# Patient Record
Sex: FEMALE | Race: WHITE | Employment: OTHER | URBAN - METROPOLITAN AREA
[De-identification: names, ages, dates, MRNs, and addresses within clinical notes are randomized per-mention and may not be internally consistent; named-entity substitution may affect disease eponyms.]

---

## 2018-08-10 ENCOUNTER — INITIAL CONSULT (OUTPATIENT)
Dept: NEUROSURGERY | Age: 78
End: 2018-08-10
Payer: MEDICARE

## 2018-08-10 VITALS
SYSTOLIC BLOOD PRESSURE: 101 MMHG | BODY MASS INDEX: 31.15 KG/M2 | DIASTOLIC BLOOD PRESSURE: 63 MMHG | HEIGHT: 61 IN | HEART RATE: 65 BPM | WEIGHT: 165 LBS

## 2018-08-10 DIAGNOSIS — B02.23 POST-HERPETIC POLYNEUROPATHY: Primary | ICD-10-CM

## 2018-08-10 PROCEDURE — 99203 OFFICE O/P NEW LOW 30 MIN: CPT | Performed by: PHYSICIAN ASSISTANT

## 2018-08-10 RX ORDER — LANOLIN ALCOHOL/MO/W.PET/CERES
500 CREAM (GRAM) TOPICAL NIGHTLY
COMMUNITY
End: 2020-10-21

## 2018-08-10 RX ORDER — HYDROCHLOROTHIAZIDE 12.5 MG/1
12.5 CAPSULE, GELATIN COATED ORAL DAILY
COMMUNITY

## 2018-08-10 RX ORDER — LISINOPRIL 2.5 MG/1
2.5 TABLET ORAL DAILY
COMMUNITY

## 2018-08-10 RX ORDER — MAGNESIUM OXIDE 400 MG/1
400 TABLET ORAL DAILY
COMMUNITY
End: 2018-12-26 | Stop reason: ALTCHOICE

## 2018-08-10 RX ORDER — PRAVASTATIN SODIUM 40 MG
40 TABLET ORAL DAILY
COMMUNITY

## 2018-08-10 RX ORDER — OMEPRAZOLE 20 MG/1
20 CAPSULE, DELAYED RELEASE ORAL DAILY
COMMUNITY
End: 2022-07-28

## 2018-08-10 ASSESSMENT — ENCOUNTER SYMPTOMS
EYES NEGATIVE: 1
ALLERGIC/IMMUNOLOGIC NEGATIVE: 1
RESPIRATORY NEGATIVE: 1
BACK PAIN: 1
GASTROINTESTINAL NEGATIVE: 1

## 2018-08-10 NOTE — PROGRESS NOTES
Subjective:      Patient ID: Andrew Ochoa is a 69 y.o. female. Back Pain   This is a chronic problem. Episode onset: 14 months. The problem occurs daily. The problem has been gradually worsening since onset. The pain is present in the thoracic spine (and right rib pain). The quality of the pain is described as stabbing. The pain is severe. Treatments tried: some injections from pain managment. The treatment provided mild relief. Review of Systems   Constitutional: Negative. HENT: Negative. Eyes: Negative. Respiratory: Negative. Cardiovascular: Negative. Gastrointestinal: Negative. Endocrine: Negative. Genitourinary: Negative. Musculoskeletal: Positive for back pain. Skin: Negative. Allergic/Immunologic: Negative. Neurological: Negative. Hematological: Negative. Psychiatric/Behavioral: Negative. Objective:   Physical Exam   Constitutional: She appears well-developed and well-nourished. HENT:   Head: Normocephalic and atraumatic. Eyes: Conjunctivae and EOM are normal. Pupils are equal, round, and reactive to light. Neck: Normal range of motion. Neck supple. Pulmonary/Chest: No respiratory distress. Abdominal: She exhibits no distension. Musculoskeletal: Normal range of motion. She exhibits no edema or deformity. Neurological: She is alert. She has normal strength and normal reflexes. She is not disoriented. No cranial nerve deficit or sensory deficit. Coordination and gait normal. GCS eye subscore is 4. GCS verbal subscore is 5. GCS motor subscore is 6. Skin: Skin is warm and dry. Assessment:      69year old female with back and right rib pain from herpes zoster. Plan:       We will consult pain management for SCS trial.        EVANGELINA Ochoa

## 2018-08-15 ENCOUNTER — OFFICE VISIT (OUTPATIENT)
Dept: PAIN MANAGEMENT | Age: 78
End: 2018-08-15
Payer: MEDICARE

## 2018-08-15 ENCOUNTER — HOSPITAL ENCOUNTER (OUTPATIENT)
Age: 78
Discharge: HOME | End: 2018-08-17
Payer: MEDICARE

## 2018-08-15 VITALS
RESPIRATION RATE: 16 BRPM | BODY MASS INDEX: 30.58 KG/M2 | TEMPERATURE: 98.3 F | HEIGHT: 61 IN | HEART RATE: 87 BPM | DIASTOLIC BLOOD PRESSURE: 60 MMHG | SYSTOLIC BLOOD PRESSURE: 100 MMHG | WEIGHT: 162 LBS | OXYGEN SATURATION: 95 %

## 2018-08-15 DIAGNOSIS — G89.29 CHRONIC RIGHT-SIDED THORACIC BACK PAIN: ICD-10-CM

## 2018-08-15 DIAGNOSIS — G89.4 CHRONIC PAIN SYNDROME: Primary | ICD-10-CM

## 2018-08-15 DIAGNOSIS — M79.2 NEURALGIA AND NEURITIS: ICD-10-CM

## 2018-08-15 DIAGNOSIS — M54.6 CHRONIC RIGHT-SIDED THORACIC BACK PAIN: ICD-10-CM

## 2018-08-15 DIAGNOSIS — B02.29 POSTHERPETIC NEURALGIA: ICD-10-CM

## 2018-08-15 PROBLEM — E66.9 OBESITY: Status: ACTIVE | Noted: 2018-06-25

## 2018-08-15 PROBLEM — B02.9 HERPES ZOSTER: Status: ACTIVE | Noted: 2018-06-25

## 2018-08-15 PROBLEM — I10 HYPERTENSIVE DISORDER: Status: ACTIVE | Noted: 2018-06-25

## 2018-08-15 LAB — SPECIFIC GRAVITY UA: 1.01 (ref 1–1.03)

## 2018-08-15 PROCEDURE — 99204 OFFICE O/P NEW MOD 45 MIN: CPT | Performed by: PAIN MEDICINE

## 2018-08-15 PROCEDURE — 81005 URINALYSIS: CPT

## 2018-08-15 PROCEDURE — G0480 DRUG TEST DEF 1-7 CLASSES: HCPCS

## 2018-08-15 PROCEDURE — 80307 DRUG TEST PRSMV CHEM ANLYZR: CPT

## 2018-08-15 RX ORDER — OXYCODONE HYDROCHLORIDE 10 MG/1
TABLET ORAL
COMMUNITY
End: 2018-10-16 | Stop reason: SDUPTHER

## 2018-08-15 RX ORDER — NORTRIPTYLINE HYDROCHLORIDE 25 MG/1
CAPSULE ORAL
COMMUNITY
End: 2020-10-21

## 2018-08-15 NOTE — PROGRESS NOTES
8/15/2018    Josue King presents to the [address] [place] [address] on 8/15/2018. Judith Jean is complaining of pain under her right breast radiating from her middle thoracic back. The pain is constant. The pain is described as throbbing, shooting, stabbing, sharp, tender, burning, exhausting, penetrating, miserable, tiring, unbearable and feels like something is crawling. Pain is rated today at a 3 a few minutes ago and has escalated to a 5 on the VAS scale. She took her last dose of Oxyocodone last night. States that it does help but c/o constipation so she doesn't take it as often as she needs. She has been on anticoagulation medications to include ASA and is managed by PCP. /60   Pulse 87   Temp 98.3 °F (36.8 °C)   Resp 16   Ht 5' 1\" (1.549 m)   Wt 162 lb (73.5 kg)   SpO2 95%   BMI 30.61 kg/m²      NOEL Luna RN    Had been prescribed Neurontin as well as Lyrica. Both had helped but only for a short period of time.
bladder complaints. All other review of systems was negative. PHYSICAL EXAMINATION:      /60   Pulse 87   Temp 98.3 °F (36.8 °C)   Resp 16   Ht 5' 1\" (1.549 m)   Wt 162 lb (73.5 kg)   SpO2 95%   BMI 30.61 kg/m²     General:      General appearance:pleasant and well-hydrated, in no distress and A & O x3  Build:Normal Weight  Function:Rises from a seated position easily. HEENT:    Head:normocephalic, atraumatic  Pupils:regular, round, equal  Sclera: icterus absent    Lungs:    Breathing:normal breathing pattern    Abdomen:    Shape:non-distended and normal  Tenderness:none  Guarding:none    Cervical spine:    Inspection:normal  Palpation:tenderness paravertebral muscles, tenderness trapezium, left, right negative. Range of motion:abnormal mildly flexion, extension rotation bilateral and is not painful. Thoracic spine:    Spine inspection:healed hypopigmented lesions at approximately T8,9 level on the right  Palpation: hyperalgesia of paraspinals, right, approx T8,9 level. Range of motion:normal in flexion, extension rotation bilateral and is not painful. Lumbar spine:    Spine inspection:normal   CVA tenderness:No   Palpation:tenderness paravertebral muscles, left, right negative. Range of motion:abnormal mildly Lateral bending, flexion, extension rotation bilateral and is not  painful.     Musculoskeletal:    Trigger points in trapezius:absent bilaterally  Trigger points in rhomboids:absent bilaterally  Trigger points in Paraveteral:absent bilaterally  Trigger points in supraspinatus/infraspinatus:absent  SI joint tenderness:negative right, negative left               Extremities:    Tremors:None bilaterally upper and lower  Intact:Yes  Varicose veins:absent   Pulses:present Lt radial  Cyanosis:none  Edema:none x all 4 extremities    Neurological:    Sensory:normal to light touch BLE, hyperalgesia right T8,9 dermatomes    Motor:                     Right Quadriceps5/5          Left

## 2018-08-19 LAB
7-AMINOCLONAZEPAM, URINE: <5 NG/ML
ALPHA-HYDROXYALPRAZOLAM, URINE: <5 NG/ML
ALPHA-HYDROXYMIDAZOLAM, URINE: <20 NG/ML
ALPRAZOLAM, URINE: <5 NG/ML
CHLORDIAZEPOXIDE, URINE: <20 NG/ML
CLONAZEPAM, URINE: <5 NG/ML
DIAZEPAM, URINE: <20 NG/ML
LORAZEPAM, URINE: <20 NG/ML
MIDAZOLAM, URINE: <20 NG/ML
NORDIAZEPAM, URINE: <20 NG/ML
OXAZEPAM, URINE: <20 NG/ML
TEMAZEPAM, URINE: <20 NG/ML

## 2018-08-20 LAB
6AM URINE: <10 NG/ML
CODEINE, URINE: <20 NG/ML
HYDROCODONE, URINE: <20 NG/ML
HYDROMORPHONE, URINE: <20 NG/ML
MORPHINE URINE: <20 NG/ML
NORHYDROCODONE, URINE: <20 NG/ML
NOROXYCODONE, URINE: 446 NG/ML
NOROXYMORPHONE, URINE: 230 NG/ML
OXYCODONE, URINE CONFIRMATION: 171 NG/ML
OXYMORPHONE, URINE: <20 NG/ML

## 2018-08-21 LAB
Lab: NORMAL
REPORT: NORMAL
THIS TEST SENT TO: NORMAL

## 2018-08-27 ENCOUNTER — TELEPHONE (OUTPATIENT)
Dept: PAIN MANAGEMENT | Age: 78
End: 2018-08-27

## 2018-08-27 NOTE — TELEPHONE ENCOUNTER
Patient called about her appointment with Dr. Yolanda Garcia. Magaly did schedule it, and we are still awaiting insurance approval for the MRI.

## 2018-08-30 ENCOUNTER — TELEPHONE (OUTPATIENT)
Dept: PAIN MANAGEMENT | Age: 78
End: 2018-08-30

## 2018-09-05 ENCOUNTER — TELEPHONE (OUTPATIENT)
Dept: PAIN MANAGEMENT | Age: 78
End: 2018-09-05

## 2018-09-17 ENCOUNTER — OFFICE VISIT (OUTPATIENT)
Dept: PAIN MANAGEMENT | Age: 78
End: 2018-09-17
Payer: MEDICARE

## 2018-09-17 DIAGNOSIS — F45.1: ICD-10-CM

## 2018-09-17 PROCEDURE — 96101 PR PSYCHOLOGIC TESTING BY PSYCH/PHYS: CPT | Performed by: PSYCHOLOGIST

## 2018-09-17 PROCEDURE — 99406 BEHAV CHNG SMOKING 3-10 MIN: CPT | Performed by: PSYCHOLOGIST

## 2018-09-24 NOTE — PROGRESS NOTES
Patient: Sherri Bishop    Date of service: 18        1940     69 y.o. 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     I.  REFERRAL:     Ms. Lincoln Capone was referred for a psychological evaluation prior to consideration for a utilization of a spinal cord stimulator. This is being considered as a method for control of chronic pain. The present evaluation involved clinical interview, mental status evaluation, and administration of the [address] [place] [address] Street (MMPI-2).     II. BACKGROUND INFORMATION:     A social history was conducted. Mini Wallace did not note any difficulties as a young child. .Ms. Lincoln Capone had normal development. She is from an intact family, though her father passed away when Ms. Lincoln Capone was 16. Mini Wallace has 2 brothers and 4 sisters. She said that she had a normal childhood.     Ms. Tello quit  high school in the 10th grade. She quit school to assist with a younger sibling who was very ill.       Ms. Lincoln Capone is . This is her only marriage and the couple have been  for 56 years. Mini Wallace has 2 sons and 1 daughter. Things are going well at home. Ms. Lincoln Capone describes her  as [place] very supportive risa. \"     Medically, the patient says she suffers from shingles. This problem started in July. When asked to describe her pain on a 0 to 10 scale with 0 being none and 10 being excruciating, she says she ranges between 5 to 8. Mini Wallace felt that analgesic medications had helped, but only briefly. Physically, the patient says she has not significant limitations. Mini Wallace is independent in self-care. When asked if she had found anything that assisted with her pain relief, Mini Wallace stated  that applying ice helps.   Liberty Real has no history of mental health treatment. Sultana's only addiction problem was nicotine. She quit smoking in .   Ms. Lincoln Capone reports  no legal or criminal history.     Avocational interests

## 2018-10-04 ENCOUNTER — PREP FOR PROCEDURE (OUTPATIENT)
Dept: PAIN MANAGEMENT | Age: 78
End: 2018-10-04

## 2018-10-04 DIAGNOSIS — B02.29 POSTHERPETIC NEURALGIA: Primary | ICD-10-CM

## 2018-10-16 ENCOUNTER — OFFICE VISIT (OUTPATIENT)
Dept: PAIN MANAGEMENT | Age: 78
End: 2018-10-16
Payer: MEDICARE

## 2018-10-16 VITALS
DIASTOLIC BLOOD PRESSURE: 72 MMHG | HEART RATE: 84 BPM | SYSTOLIC BLOOD PRESSURE: 102 MMHG | TEMPERATURE: 98.1 F | OXYGEN SATURATION: 97 %

## 2018-10-16 DIAGNOSIS — G89.29 CHRONIC RIGHT-SIDED THORACIC BACK PAIN: ICD-10-CM

## 2018-10-16 DIAGNOSIS — B02.29 POSTHERPETIC NEURALGIA: ICD-10-CM

## 2018-10-16 DIAGNOSIS — G89.4 CHRONIC PAIN SYNDROME: Primary | ICD-10-CM

## 2018-10-16 DIAGNOSIS — M54.6 CHRONIC RIGHT-SIDED THORACIC BACK PAIN: ICD-10-CM

## 2018-10-16 PROCEDURE — 99213 OFFICE O/P EST LOW 20 MIN: CPT | Performed by: PAIN MEDICINE

## 2018-10-16 PROCEDURE — 99214 OFFICE O/P EST MOD 30 MIN: CPT | Performed by: PAIN MEDICINE

## 2018-10-16 RX ORDER — OXYCODONE HYDROCHLORIDE 10 MG/1
10 TABLET ORAL 2 TIMES DAILY PRN
Qty: 60 TABLET | Refills: 0 | Status: SHIPPED | OUTPATIENT
Start: 2018-10-16 | End: 2018-11-19 | Stop reason: SDUPTHER

## 2018-10-17 ENCOUNTER — TELEPHONE (OUTPATIENT)
Dept: PAIN MANAGEMENT | Age: 78
End: 2018-10-17

## 2018-10-17 NOTE — TELEPHONE ENCOUNTER
Dtr Eun Chan called in, she has some questions about the procedure her mother is going to have. After the 1 week trial period will she be in pain or what will happen. She asked that you call her at [phone] or Mone's number is [phone].

## 2018-10-24 ENCOUNTER — TELEPHONE (OUTPATIENT)
Dept: PAIN MANAGEMENT | Age: 78
End: 2018-10-24

## 2018-10-30 ENCOUNTER — HOSPITAL ENCOUNTER (OUTPATIENT)
Age: 78
Setting detail: OUTPATIENT SURGERY
Discharge: HOME | End: 2018-10-30
Attending: PAIN MEDICINE | Admitting: PAIN MEDICINE
Payer: MEDICARE

## 2018-10-30 ENCOUNTER — HOSPITAL ENCOUNTER (OUTPATIENT)
Dept: OPERATING ROOM | Age: 78
Setting detail: OUTPATIENT SURGERY
Discharge: HOME | End: 2018-10-30
Attending: PAIN MEDICINE
Payer: MEDICARE

## 2018-10-30 VITALS
OXYGEN SATURATION: 96 % | SYSTOLIC BLOOD PRESSURE: 137 MMHG | DIASTOLIC BLOOD PRESSURE: 79 MMHG | HEART RATE: 74 BPM | RESPIRATION RATE: 16 BRPM

## 2018-10-30 DIAGNOSIS — Z96.89 S/P INSERTION OF SPINAL CORD STIMULATOR: ICD-10-CM

## 2018-10-30 PROCEDURE — 63650 IMPLANT NEUROELECTRODES: CPT | Performed by: PAIN MEDICINE

## 2018-10-30 PROCEDURE — 3209999900 FLUORO FOR SURGICAL PROCEDURES

## 2018-10-30 PROCEDURE — 3600000015 HC SURGERY LEVEL 5 ADDTL 15MIN: Performed by: PAIN MEDICINE

## 2018-10-30 PROCEDURE — 6360000002 HC RX W HCPCS: Performed by: PAIN MEDICINE

## 2018-10-30 PROCEDURE — 3600000005 HC SURGERY LEVEL 5 BASE: Performed by: PAIN MEDICINE

## 2018-10-30 PROCEDURE — C1778 LEAD, NEUROSTIMULATOR: HCPCS | Performed by: PAIN MEDICINE

## 2018-10-30 PROCEDURE — 7100000010 HC PHASE II RECOVERY - FIRST 15 MIN: Performed by: PAIN MEDICINE

## 2018-10-30 PROCEDURE — 7100000011 HC PHASE II RECOVERY - ADDTL 15 MIN: Performed by: PAIN MEDICINE

## 2018-10-30 PROCEDURE — 2709999900 HC NON-CHARGEABLE SUPPLY: Performed by: PAIN MEDICINE

## 2018-10-30 PROCEDURE — 95972 ALYS CPLX SP/PN NPGT W/PRGRM: CPT | Performed by: PAIN MEDICINE

## 2018-10-30 PROCEDURE — 2780000010 HC IMPLANT OTHER: Performed by: PAIN MEDICINE

## 2018-10-30 PROCEDURE — 2500000003 HC RX 250 WO HCPCS: Performed by: PAIN MEDICINE

## 2018-10-30 DEVICE — ANCHOR
Type: IMPLANTABLE DEVICE | Status: FUNCTIONAL
Brand: CLIK™ X

## 2018-10-30 DEVICE — 50CM 16 CONTACT TRIAL LEAD KIT
Type: IMPLANTABLE DEVICE | Site: BACK | Status: FUNCTIONAL
Brand: INFINION™  16

## 2018-10-30 RX ORDER — CEPHALEXIN 500 MG/1
500 CAPSULE ORAL 2 TIMES DAILY
Qty: 14 CAPSULE | Refills: 0 | Status: SHIPPED | OUTPATIENT
Start: 2018-10-30 | End: 2018-11-06

## 2018-10-30 RX ORDER — LIDOCAINE HYDROCHLORIDE 5 MG/ML
INJECTION, SOLUTION INFILTRATION; INTRAVENOUS PRN
Status: DISCONTINUED | OUTPATIENT
Start: 2018-10-30 | End: 2018-10-30 | Stop reason: HOSPADM

## 2018-10-30 RX ORDER — CEFAZOLIN SODIUM 2 G/50ML
2 SOLUTION INTRAVENOUS ONCE
Status: COMPLETED | OUTPATIENT
Start: 2018-10-30 | End: 2018-10-30

## 2018-10-30 RX ORDER — SODIUM CHLORIDE, SODIUM LACTATE, POTASSIUM CHLORIDE, CALCIUM CHLORIDE 600; 310; 30; 20 MG/100ML; MG/100ML; MG/100ML; MG/100ML
INJECTION, SOLUTION INTRAVENOUS CONTINUOUS
Status: CANCELLED | OUTPATIENT
Start: 2018-10-30

## 2018-10-30 RX ORDER — CEFAZOLIN SODIUM 1 G/50ML
1 SOLUTION INTRAVENOUS ONCE
Status: DISCONTINUED | OUTPATIENT
Start: 2018-10-30 | End: 2018-10-30

## 2018-10-30 RX ADMIN — CEFAZOLIN SODIUM 2 G: 2 SOLUTION INTRAVENOUS at 08:15

## 2018-10-30 ASSESSMENT — PAIN DESCRIPTION - DESCRIPTORS
DESCRIPTORS: ACHING;SORE
DESCRIPTORS: DISCOMFORT

## 2018-10-30 ASSESSMENT — PAIN DESCRIPTION - PAIN TYPE
TYPE: SURGICAL PAIN

## 2018-10-30 ASSESSMENT — PAIN DESCRIPTION - LOCATION
LOCATION: BACK

## 2018-10-30 ASSESSMENT — PAIN SCALES - GENERAL
PAINLEVEL_OUTOF10: 8
PAINLEVEL_OUTOF10: 8
PAINLEVEL_OUTOF10: 6

## 2018-10-30 ASSESSMENT — PAIN - FUNCTIONAL ASSESSMENT: PAIN_FUNCTIONAL_ASSESSMENT: 0-10

## 2018-10-30 NOTE — H&P
[facility]  [address] [facility], [address] [place] [address] [place]  [phone]    Procedure History & Physical      Myrtice Brochure     HPI:    Patient  is here for right thoracic pain for thoracic SCS trial  Labs/imaging studies reviewed   All question and concerns addressed including R/B/A associated with the procedure    Past Medical History:   Diagnosis Date    Cervical spondylosis 7/23/2013    Confusion     Depressed     Fibromyalgia     in her neck    Heartburn     Irritable bowel     Memory loss     Migraines, neuralgic     Ringing in ears     Sleep apnea        Past Surgical History:   Procedure Laterality Date    CERVICAL FUSION  1/22/16    ANTERIOR CERVICAL FUSION C5-6, C6-7    COLONOSCOPY      CYSTOSCOPY  4/3/14    retrograde pyelogram urethral dilatation  laser lithotripsy    HEMORRHOID SURGERY      HYSTERECTOMY      LITHOTRIPSY      OVARY REMOVAL Left        Prior to Admission medications    Medication Sig Start Date End Date Taking? Authorizing Provider   amoxicillin (AMOXIL) 500 MG capsule take 1 capsule by mouth every 8 hours 6/13/18   Historical Provider, MD   fluconazole (DIFLUCAN) 150 MG tablet take 1 tablet by mouth ONCE 6/15/18   Historical Provider, MD   SUMAtriptan Succinate 6 MG/0.5ML SOAJ inject 0.5 milliliter INTO THE SKIN ONCE AS NEEDED FOR MIGRAINE 4/12/18   Historical Provider, MD   pregabalin (LYRICA) 75 MG capsule Take 75 mg by mouth 3 times daily. Bernie Luz Historical Provider, MD   ranitidine (ZANTAC) 150 MG tablet Take 150 mg by mouth 2 times daily    Historical Provider, MD   zolpidem (AMBIEN) 5 MG tablet Take 5 mg by mouth nightly as needed for Sleep. Bernie Luz Historical Provider, MD   amitriptyline (ELAVIL) 25 MG tablet Take 2 tablets by mouth nightly 4/12/18 5/31/18  Gianna Worrell MD   pregabalin (LYRICA) 75 MG capsule Take 1 capsule by mouth 3 times daily for 30 days. . 4/12/18 5/31/18  Gianna Worrell MD   diclofenac (CATAFLAM) 50 MG

## 2018-10-30 NOTE — OP NOTE
external pulse generator using a sterile connector. Several combinations of electrode configuration, frequency, amplitude and pulse width were used until comfortable, appropriate coverage of the patient's pain area was obtained. The needle was then carefully removed under live fluoroscopy and the lead was secured to the skin using a twist-lock anchor and 2-0 Nylon sutures. Fluoroscopy was used to confirm continued proper placement of the lead before being anchored . The anchor was covered with a gauze dressing under the anchor to pad the skin. The connectors were also wrapped in gauze and secured to the patient. Patient back was then cleansed, antibiotic ointment was placed over the surgical site and opsites was placed to cover the lead and the connectors. More programming was performed in the recovery area with the device representative. Disposition the patient tolerated the procedure well and there were no complications . Vital signs remained stable throughout the procedure. The patient was escorted to the recovery area where they remained until discharge and written discharge instructions for the procedure were given. Plan: Rubina Rosenberg will return to our pain management center as scheduled.      Kimberli Boyd, DO

## 2018-11-06 ENCOUNTER — OFFICE VISIT (OUTPATIENT)
Dept: PAIN MANAGEMENT | Age: 78
End: 2018-11-06
Payer: MEDICARE

## 2018-11-06 VITALS
TEMPERATURE: 98.3 F | BODY MASS INDEX: 30.58 KG/M2 | OXYGEN SATURATION: 99 % | SYSTOLIC BLOOD PRESSURE: 122 MMHG | HEIGHT: 61 IN | DIASTOLIC BLOOD PRESSURE: 74 MMHG | HEART RATE: 74 BPM | WEIGHT: 162 LBS | RESPIRATION RATE: 18 BRPM

## 2018-11-06 DIAGNOSIS — M79.2 NEURALGIA AND NEURITIS: ICD-10-CM

## 2018-11-06 DIAGNOSIS — B02.29 POSTHERPETIC NEURALGIA: Primary | ICD-10-CM

## 2018-11-06 DIAGNOSIS — G89.4 CHRONIC PAIN SYNDROME: ICD-10-CM

## 2018-11-06 PROCEDURE — 99213 OFFICE O/P EST LOW 20 MIN: CPT | Performed by: PAIN MEDICINE

## 2018-11-19 ENCOUNTER — TELEPHONE (OUTPATIENT)
Dept: PAIN MANAGEMENT | Age: 78
End: 2018-11-19

## 2018-11-19 DIAGNOSIS — B02.29 POSTHERPETIC NEURALGIA: ICD-10-CM

## 2018-11-19 DIAGNOSIS — M54.6 CHRONIC RIGHT-SIDED THORACIC BACK PAIN: ICD-10-CM

## 2018-11-19 DIAGNOSIS — G89.4 CHRONIC PAIN SYNDROME: ICD-10-CM

## 2018-11-19 DIAGNOSIS — G89.29 CHRONIC RIGHT-SIDED THORACIC BACK PAIN: ICD-10-CM

## 2018-11-19 NOTE — TELEPHONE ENCOUNTER
Safia Kraft called in and she stated that she was told to call in if she needs a refill of Oxycontin. Med pended. Next appointment is on 12-4.

## 2018-11-20 RX ORDER — OXYCODONE HYDROCHLORIDE 10 MG/1
10 TABLET ORAL 2 TIMES DAILY PRN
Qty: 28 TABLET | Refills: 0 | Status: SHIPPED | OUTPATIENT
Start: 2018-11-20 | End: 2018-12-04 | Stop reason: SDUPTHER

## 2018-11-30 NOTE — PROGRESS NOTES
[address]  [address] [facility], [address] [place] [address] [place]  [phone]    Follow up Note      Sina Juarez     Date of Visit:  12/4/2018    CC:  Patient presents for follow up   Chief Complaint   Patient presents with    Pain     back goes around to the right breast      HPI:    Pain is unchanged. Change in quality of symptoms:no. Medication side effects:none. Recent diagnostic testing:  Recent interventional procedures:SCS trial with 80% relief, did not require opioid pain medications, was more functional and was able to wear a bra    She has been on anticoagulation medications to include ASA 81 mg and has not been on herbal supplements. She is not diabetic.     Imaging:   Thoracic MRI 9/2018 -   FINDINGS: Multiplanar, multisequence MR images of the thoracic spine. Images degraded by artifact.       Thoracic vertebral height and alignment intact. No acute osseous   signal and mallet. Thoracic spinal cord exhibits unremarkable caliber   and signal. Within the exam limits, no disc herniation or central   spinal canal impingement. Lumbar xray 2018 -   1. Osteoporosis and rotoscoliosis. 2. Multilevel degenerative disc disease. 3. Disc narrowing at L3-4 is seen. Thoracic xray 2018 -   1. Osteoporosis and rotoscoliosis. 2. Multilevel disc narrowing with only minimal spurring.    3. No fracture, subluxation or destructive lesion.      Previous treatments: Acupuncture, Nerve block (intercostal), Epidural Steroid Injection x5 and medications ((gabapentin, nortriptyline, lidocaine patches and cream, prednisone, capsaicin cream, tramadol, duloxetine, cyclobenzaprine, Lyrica 50 mg and 100 mg, aspercream/lidocaine, cymbalta, fentanyl patches 12 mcg/hr and 25 mcg/hr, oxycodone 5 mg and 10 mg, carbamazepine (caused staggering and double vision), terrasil cream)) - the medications sometimes work for a short time then stop working    Potential Aberrant Drug-Related

## 2018-12-04 ENCOUNTER — OFFICE VISIT (OUTPATIENT)
Dept: PAIN MANAGEMENT | Age: 78
End: 2018-12-04
Payer: MEDICARE

## 2018-12-04 VITALS
BODY MASS INDEX: 32.59 KG/M2 | DIASTOLIC BLOOD PRESSURE: 72 MMHG | HEART RATE: 87 BPM | HEIGHT: 60 IN | OXYGEN SATURATION: 98 % | WEIGHT: 166 LBS | SYSTOLIC BLOOD PRESSURE: 124 MMHG | RESPIRATION RATE: 18 BRPM

## 2018-12-04 DIAGNOSIS — G89.29 CHRONIC RIGHT-SIDED THORACIC BACK PAIN: ICD-10-CM

## 2018-12-04 DIAGNOSIS — M79.2 NEURALGIA AND NEURITIS: Primary | ICD-10-CM

## 2018-12-04 DIAGNOSIS — B02.29 POSTHERPETIC NEURALGIA: ICD-10-CM

## 2018-12-04 DIAGNOSIS — M54.6 CHRONIC RIGHT-SIDED THORACIC BACK PAIN: ICD-10-CM

## 2018-12-04 DIAGNOSIS — G89.4 CHRONIC PAIN SYNDROME: ICD-10-CM

## 2018-12-04 PROCEDURE — 99213 OFFICE O/P EST LOW 20 MIN: CPT | Performed by: PAIN MEDICINE

## 2018-12-04 RX ORDER — OXYCODONE HYDROCHLORIDE 10 MG/1
10 TABLET ORAL 2 TIMES DAILY PRN
Qty: 60 TABLET | Refills: 0 | Status: SHIPPED | OUTPATIENT
Start: 2018-12-04 | End: 2019-01-03

## 2018-12-04 NOTE — PROGRESS NOTES
Vy Pedroza presents to the [address] on 12/4/2018. Heather Lombardo is complaining of pain in the back around the chest to the breast. The pain is constant. The pain is described as stabbing. Pain is rated today at a 8 on the VAS scale. She took her last dose of oxy ir   on  This a . She has been on anticoagulation medications to include ASA and is managed by . Marv Duenas MD    .      /72   Pulse 87   Resp 18   Ht 5' (1.524 m)   Wt 166 lb (75.3 kg)   SpO2 98%   BMI 32.42 kg/m²

## 2018-12-10 PROBLEM — F45.1: Status: ACTIVE | Noted: 2018-12-10

## 2018-12-13 ENCOUNTER — OFFICE VISIT (OUTPATIENT)
Dept: NEUROSURGERY | Age: 78
End: 2018-12-13
Payer: MEDICARE

## 2018-12-13 VITALS
WEIGHT: 170 LBS | HEART RATE: 92 BPM | BODY MASS INDEX: 33.38 KG/M2 | SYSTOLIC BLOOD PRESSURE: 128 MMHG | HEIGHT: 60 IN | DIASTOLIC BLOOD PRESSURE: 83 MMHG

## 2018-12-13 DIAGNOSIS — B02.29 POSTHERPETIC NEURALGIA AT T3-T5 LEVEL: Primary | ICD-10-CM

## 2018-12-13 PROCEDURE — 99213 OFFICE O/P EST LOW 20 MIN: CPT | Performed by: NEUROLOGICAL SURGERY

## 2018-12-13 NOTE — PROGRESS NOTES
Patient is here for follow up consult for: post-herpetic neuralgia. Physical exam  Alert and Oriented X3  PERRLA, EOMI  OLIVERA 5/5  Sensation intact to LT and PP  Reflexes are 2+ and symmetric    A/P: patient is here for follow up for: right sided post-herpetic neuralgia. She had a spinal cord stimulator trial with 80% improvement in her pain. I will schedule her for a T4 spinal cord stimulator paddle placement with [employer]

## 2018-12-17 ENCOUNTER — PREP FOR PROCEDURE (OUTPATIENT)
Dept: NEUROSURGERY | Age: 78
End: 2018-12-17

## 2018-12-17 DIAGNOSIS — B02.29 POST HERPETIC NEURALGIA: Primary | ICD-10-CM

## 2018-12-17 DIAGNOSIS — M79.604 LEG PAIN, RIGHT: ICD-10-CM

## 2018-12-17 DIAGNOSIS — R52 PAIN: ICD-10-CM

## 2018-12-17 RX ORDER — SODIUM CHLORIDE 0.9 % (FLUSH) 0.9 %
10 SYRINGE (ML) INJECTION EVERY 12 HOURS SCHEDULED
Status: CANCELLED | OUTPATIENT
Start: 2018-12-17

## 2018-12-17 RX ORDER — SODIUM CHLORIDE 0.9 % (FLUSH) 0.9 %
10 SYRINGE (ML) INJECTION PRN
Status: CANCELLED | OUTPATIENT
Start: 2018-12-17

## 2018-12-26 ENCOUNTER — HOSPITAL ENCOUNTER (OUTPATIENT)
Dept: GENERAL RADIOLOGY | Age: 78
Discharge: HOME | End: 2018-12-28
Payer: MEDICARE

## 2018-12-26 ENCOUNTER — HOSPITAL ENCOUNTER (OUTPATIENT)
Dept: PREADMISSION TESTING | Age: 78
Discharge: HOME | End: 2018-12-26
Payer: MEDICARE

## 2018-12-26 VITALS
SYSTOLIC BLOOD PRESSURE: 112 MMHG | HEIGHT: 60 IN | HEART RATE: 72 BPM | BODY MASS INDEX: 33.57 KG/M2 | RESPIRATION RATE: 20 BRPM | WEIGHT: 171 LBS | TEMPERATURE: 98 F | DIASTOLIC BLOOD PRESSURE: 67 MMHG

## 2018-12-26 DIAGNOSIS — M79.604 LEG PAIN, RIGHT: ICD-10-CM

## 2018-12-26 DIAGNOSIS — R52 PAIN: ICD-10-CM

## 2018-12-26 DIAGNOSIS — B02.29 POST HERPETIC NEURALGIA: ICD-10-CM

## 2018-12-26 LAB
ABO/RH: NORMAL
ANION GAP SERPL CALCULATED.3IONS-SCNC: 10 MMOL/L (ref 7–16)
ANTIBODY SCREEN: NORMAL
BACTERIA: ABNORMAL /HPF
BASOPHILS ABSOLUTE: 0.03 E9/L (ref 0–0.2)
BASOPHILS RELATIVE PERCENT: 0.5 % (ref 0–2)
BILIRUBIN URINE: NEGATIVE
BLOOD, URINE: NORMAL
BUN BLDV-MCNC: 18 MG/DL (ref 8–23)
CALCIUM SERPL-MCNC: 9.2 MG/DL (ref 8.6–10.2)
CHLORIDE BLD-SCNC: 105 MMOL/L (ref 98–107)
CLARITY: CLEAR
CO2: 25 MMOL/L (ref 22–29)
COLOR: YELLOW
CREAT SERPL-MCNC: 1 MG/DL (ref 0.5–1)
EOSINOPHILS ABSOLUTE: 0.06 E9/L (ref 0.05–0.5)
EOSINOPHILS RELATIVE PERCENT: 1.1 % (ref 0–6)
EPITHELIAL CELLS, UA: ABNORMAL /HPF
GFR AFRICAN AMERICAN: >60
GFR NON-AFRICAN AMERICAN: 54 ML/MIN/1.73
GLUCOSE BLD-MCNC: 81 MG/DL (ref 74–99)
GLUCOSE URINE: NEGATIVE MG/DL
HCT VFR BLD CALC: 37.5 % (ref 34–48)
HEMOGLOBIN: 12 G/DL (ref 11.5–15.5)
IMMATURE GRANULOCYTES #: 0 E9/L
IMMATURE GRANULOCYTES %: 0 % (ref 0–5)
INR BLD: 0.9
KETONES, URINE: NEGATIVE MG/DL
LEUKOCYTE ESTERASE, URINE: NEGATIVE
LYMPHOCYTES ABSOLUTE: 2.53 E9/L (ref 1.5–4)
LYMPHOCYTES RELATIVE PERCENT: 46.3 % (ref 20–42)
MCH RBC QN AUTO: 29.3 PG (ref 26–35)
MCHC RBC AUTO-ENTMCNC: 32 % (ref 32–34.5)
MCV RBC AUTO: 91.7 FL (ref 80–99.9)
MONOCYTES ABSOLUTE: 0.55 E9/L (ref 0.1–0.95)
MONOCYTES RELATIVE PERCENT: 10.1 % (ref 2–12)
NEUTROPHILS ABSOLUTE: 2.3 E9/L (ref 1.8–7.3)
NEUTROPHILS RELATIVE PERCENT: 42 % (ref 43–80)
NITRITE, URINE: NEGATIVE
PDW BLD-RTO: 12 FL (ref 11.5–15)
PH UA: 5.5 (ref 5–9)
PLATELET # BLD: 330 E9/L (ref 130–450)
PMV BLD AUTO: 8.7 FL (ref 7–12)
POTASSIUM REFLEX MAGNESIUM: 4.8 MMOL/L (ref 3.5–5)
PROTEIN UA: NEGATIVE MG/DL
PROTHROMBIN TIME: 10.6 SEC (ref 9.3–12.4)
RBC # BLD: 4.09 E12/L (ref 3.5–5.5)
RBC UA: ABNORMAL /HPF (ref 0–2)
SODIUM BLD-SCNC: 140 MMOL/L (ref 132–146)
SPECIFIC GRAVITY UA: <=1.005 (ref 1–1.03)
UROBILINOGEN, URINE: 0.2 E.U./DL
WBC # BLD: 5.5 E9/L (ref 4.5–11.5)
WBC UA: ABNORMAL /HPF (ref 0–5)

## 2018-12-26 PROCEDURE — 85610 PROTHROMBIN TIME: CPT

## 2018-12-26 PROCEDURE — 81001 URINALYSIS AUTO W/SCOPE: CPT

## 2018-12-26 PROCEDURE — 80048 BASIC METABOLIC PNL TOTAL CA: CPT

## 2018-12-26 PROCEDURE — 71046 X-RAY EXAM CHEST 2 VIEWS: CPT

## 2018-12-26 PROCEDURE — 86901 BLOOD TYPING SEROLOGIC RH(D): CPT

## 2018-12-26 PROCEDURE — 85025 COMPLETE CBC W/AUTO DIFF WBC: CPT

## 2018-12-26 PROCEDURE — 86850 RBC ANTIBODY SCREEN: CPT

## 2018-12-26 PROCEDURE — 86900 BLOOD TYPING SEROLOGIC ABO: CPT

## 2018-12-26 PROCEDURE — 87088 URINE BACTERIA CULTURE: CPT

## 2018-12-26 PROCEDURE — 36415 COLL VENOUS BLD VENIPUNCTURE: CPT

## 2018-12-26 ASSESSMENT — PAIN DESCRIPTION - ORIENTATION: ORIENTATION: RIGHT

## 2018-12-26 ASSESSMENT — PAIN DESCRIPTION - DESCRIPTORS: DESCRIPTORS: BURNING;ACHING

## 2018-12-26 ASSESSMENT — PAIN DESCRIPTION - PAIN TYPE: TYPE: ACUTE PAIN

## 2018-12-26 ASSESSMENT — PAIN DESCRIPTION - FREQUENCY: FREQUENCY: CONTINUOUS

## 2018-12-26 ASSESSMENT — PAIN DESCRIPTION - LOCATION: LOCATION: BACK;BREAST

## 2018-12-26 ASSESSMENT — PAIN SCALES - GENERAL: PAINLEVEL_OUTOF10: 4

## 2018-12-26 NOTE — PROGRESS NOTES
[facility] PRE-ADMISSION TESTING GENERAL INSTRUCTIONS- [facility]-phone number:[phone]    GENERAL INSTRUCTIONS  [x] Antibacterial Soap shower Night before and/or AM of Surgery  [] Nolan wipe instruction sheet and wipes given. [x] Nothing by mouth after midnight, including gum, candy, mints, or water. [x] You may brush your teeth, gargle, but do NOT swallow water. []Hibiclens shower  the night before and the morning of surgery. Do not use             Hibiclens on your face or head. [x]No smoking, chewing tobacco, illegal drugs, or alcohol within 24 hours of your surgery. [x] Jewelry, valuables or body piercing's should not be brought to the hospital. All body and/or tongue piercing's must be removed prior to arriving to hospital.  ALL hair pins must be removed. [x] Do not wear makeup, lotions, powders, deodorant. Nail polish as directed by the nurse. [x] Arrange transportation to and from the hospital.  Arrange for someone to be with you for the remainder of the day and for 24 hours after your procedure due to having had anesthesia. [] Bring insurance card and photo ID. [x] Transfusion Bracelet: Please bring with you to hospital, day of surgery  [] Bring urine specimen day of surgery. Any small container is acceptable. [] Use inhalers the morning of surgery and bring with you to hospital.   []Bring copy of living will or healthcare power of  papers to be placed in your electronic record. [] CPAP/BI-PAP: Please bring your machine if you are to spend the night in the hospital.     ENDOSCOPY INSTRUCTIONS:   [] Bowel prep instructions reviewed. [] Nothing by mouth after midnight, including gum, candy, mints, or water.  [] You may brush your teeth, gargle, but do NOT swallow water. [] Do not wear makeup, lotions, powders, deodorant. Nail polish as directed by the nurse.   [] Arrange transportation to and from the hospital.  Arrange for someone to be with you for the procedure, you may call the pre-op area if you have concerns about your blood sugar [phone]. [] Use your inhalers the morning of surgery. Bring your emergency inhaler with you day of surgery. [x] Follow physician instructions regarding any blood thinners you may be taking. WHAT TO EXPECT:  [x] The day of surgery you will be greeted and  checked in by the [employer] .  A nurse will greet you in accordance to the time you are needed in the pre-op area to prepare you for surgery. Please do not be discouraged if you are not greeted in the order you arrive as there are many variables that are involved in patient preparation. Your patience is greatly appreciated as you wait for your nurse. Please bring in items such as: books, magazines, newspapers, electronics, or any other items  to occupy your time in the waiting area. [x]  Delays may occur with surgery and staff will make a sincere effort to keep you informed of delays. If any delays occur with your procedure, we apologize ahead of time for your inconvenience as we recognize the value of your time.

## 2018-12-27 LAB — URINE CULTURE, ROUTINE: NORMAL

## 2018-12-28 ENCOUNTER — TELEPHONE (OUTPATIENT)
Dept: PAIN MANAGEMENT | Age: 78
End: 2018-12-28

## 2019-01-04 ENCOUNTER — PREP FOR PROCEDURE (OUTPATIENT)
Dept: NEUROSURGERY | Age: 79
End: 2019-01-04

## 2019-01-04 RX ORDER — INFLUENZA A VIRUS A/MICHIGAN/45/2015 X-275 (H1N1) ANTIGEN (FORMALDEHYDE INACTIVATED), INFLUENZA A VIRUS A/SINGAPORE/INFIMH-16-0019/2016 IVR-186 (H3N2) ANTIGEN (FORMALDEHYDE INACTIVATED), AND INFLUENZA B VIRUS B/MARYLAND/15/2016 BX-69A (A B/COLORADO/6/2017-LIKE VIRUS) ANTIGEN (FORMALDEHYDE INACTIVATED) 60; 60; 60 UG/.5ML; UG/.5ML; UG/.5ML
INJECTION, SUSPENSION INTRAMUSCULAR
Refills: 0 | COMMUNITY
Start: 2018-12-26

## 2019-01-04 RX ORDER — SODIUM CHLORIDE 0.9 % (FLUSH) 0.9 %
10 SYRINGE (ML) INJECTION PRN
Status: CANCELLED | OUTPATIENT
Start: 2019-01-04

## 2019-01-04 RX ORDER — SODIUM CHLORIDE 9 MG/ML
INJECTION, SOLUTION INTRAVENOUS CONTINUOUS
Status: CANCELLED | OUTPATIENT
Start: 2019-01-04

## 2019-01-04 RX ORDER — SODIUM CHLORIDE 0.9 % (FLUSH) 0.9 %
10 SYRINGE (ML) INJECTION EVERY 12 HOURS SCHEDULED
Status: CANCELLED | OUTPATIENT
Start: 2019-01-04

## 2019-01-06 ENCOUNTER — ANESTHESIA EVENT (OUTPATIENT)
Dept: OPERATING ROOM | Age: 79
End: 2019-01-06
Payer: MEDICARE

## 2019-01-07 ENCOUNTER — APPOINTMENT (OUTPATIENT)
Dept: GENERAL RADIOLOGY | Age: 79
End: 2019-01-07
Attending: NEUROLOGICAL SURGERY
Payer: MEDICARE

## 2019-01-07 ENCOUNTER — ANESTHESIA (OUTPATIENT)
Dept: OPERATING ROOM | Age: 79
End: 2019-01-07
Payer: MEDICARE

## 2019-01-07 ENCOUNTER — HOSPITAL ENCOUNTER (OUTPATIENT)
Age: 79
Setting detail: OUTPATIENT SURGERY
Discharge: HOME | End: 2019-01-07
Attending: NEUROLOGICAL SURGERY | Admitting: NEUROLOGICAL SURGERY
Payer: MEDICARE

## 2019-01-07 VITALS
OXYGEN SATURATION: 96 % | BODY MASS INDEX: 33.57 KG/M2 | RESPIRATION RATE: 18 BRPM | SYSTOLIC BLOOD PRESSURE: 130 MMHG | DIASTOLIC BLOOD PRESSURE: 85 MMHG | HEART RATE: 85 BPM | HEIGHT: 60 IN | WEIGHT: 171 LBS | TEMPERATURE: 97.2 F

## 2019-01-07 VITALS
RESPIRATION RATE: 14 BRPM | OXYGEN SATURATION: 100 % | DIASTOLIC BLOOD PRESSURE: 66 MMHG | SYSTOLIC BLOOD PRESSURE: 101 MMHG

## 2019-01-07 DIAGNOSIS — G89.4 CHRONIC PAIN SYNDROME: Primary | ICD-10-CM

## 2019-01-07 PROCEDURE — 3700000001 HC ADD 15 MINUTES (ANESTHESIA): Performed by: NEUROLOGICAL SURGERY

## 2019-01-07 PROCEDURE — 3209999900 FLUORO FOR SURGICAL PROCEDURES

## 2019-01-07 PROCEDURE — 3600000003 HC SURGERY LEVEL 3 BASE: Performed by: NEUROLOGICAL SURGERY

## 2019-01-07 PROCEDURE — 2500000003 HC RX 250 WO HCPCS

## 2019-01-07 PROCEDURE — C1787 PATIENT PROGR, NEUROSTIM: HCPCS | Performed by: NEUROLOGICAL SURGERY

## 2019-01-07 PROCEDURE — 6360000002 HC RX W HCPCS

## 2019-01-07 PROCEDURE — 63685 INS/RPLC SPI NPG/RCVR POCKET: CPT | Performed by: NEUROLOGICAL SURGERY

## 2019-01-07 PROCEDURE — 7100000011 HC PHASE II RECOVERY - ADDTL 15 MIN: Performed by: NEUROLOGICAL SURGERY

## 2019-01-07 PROCEDURE — 3600000013 HC SURGERY LEVEL 3 ADDTL 15MIN: Performed by: NEUROLOGICAL SURGERY

## 2019-01-07 PROCEDURE — C1820 GENERATOR NEURO RECHG BAT SY: HCPCS | Performed by: NEUROLOGICAL SURGERY

## 2019-01-07 PROCEDURE — 7100000001 HC PACU RECOVERY - ADDTL 15 MIN: Performed by: NEUROLOGICAL SURGERY

## 2019-01-07 PROCEDURE — 6360000002 HC RX W HCPCS: Performed by: NEUROLOGICAL SURGERY

## 2019-01-07 PROCEDURE — 63655 IMPLANT NEUROELECTRODES: CPT | Performed by: NEUROLOGICAL SURGERY

## 2019-01-07 PROCEDURE — 2780000010 HC IMPLANT OTHER: Performed by: NEUROLOGICAL SURGERY

## 2019-01-07 PROCEDURE — 7100000000 HC PACU RECOVERY - FIRST 15 MIN: Performed by: NEUROLOGICAL SURGERY

## 2019-01-07 PROCEDURE — 2580000003 HC RX 258: Performed by: PHYSICIAN ASSISTANT

## 2019-01-07 PROCEDURE — 6360000002 HC RX W HCPCS: Performed by: PHYSICIAN ASSISTANT

## 2019-01-07 PROCEDURE — 2580000003 HC RX 258: Performed by: NEUROLOGICAL SURGERY

## 2019-01-07 PROCEDURE — 7100000010 HC PHASE II RECOVERY - FIRST 15 MIN: Performed by: NEUROLOGICAL SURGERY

## 2019-01-07 PROCEDURE — C1713 ANCHOR/SCREW BN/BN,TIS/BN: HCPCS | Performed by: NEUROLOGICAL SURGERY

## 2019-01-07 PROCEDURE — 2709999900 HC NON-CHARGEABLE SUPPLY: Performed by: NEUROLOGICAL SURGERY

## 2019-01-07 PROCEDURE — 2500000003 HC RX 250 WO HCPCS: Performed by: NEUROLOGICAL SURGERY

## 2019-01-07 PROCEDURE — 2580000003 HC RX 258

## 2019-01-07 PROCEDURE — 2720000010 HC SURG SUPPLY STERILE: Performed by: NEUROLOGICAL SURGERY

## 2019-01-07 PROCEDURE — 3700000000 HC ANESTHESIA ATTENDED CARE: Performed by: NEUROLOGICAL SURGERY

## 2019-01-07 DEVICE — IMPLANTABLE PULSE GENERATOR KIT
Type: IMPLANTABLE DEVICE | Site: BACK | Status: FUNCTIONAL
Brand: SPECTRA WAVEWRITER™

## 2019-01-07 DEVICE — ANCHOR
Type: IMPLANTABLE DEVICE | Site: BACK | Status: FUNCTIONAL
Brand: CLIK™ X

## 2019-01-07 RX ORDER — CYCLOBENZAPRINE HCL 10 MG
10 TABLET ORAL 3 TIMES DAILY PRN
Status: DISCONTINUED | OUTPATIENT
Start: 2019-01-07 | End: 2019-01-07 | Stop reason: HOSPADM

## 2019-01-07 RX ORDER — MORPHINE SULFATE 2 MG/ML
2 INJECTION, SOLUTION INTRAMUSCULAR; INTRAVENOUS EVERY 5 MIN PRN
Status: DISCONTINUED | OUTPATIENT
Start: 2019-01-07 | End: 2019-01-07 | Stop reason: HOSPADM

## 2019-01-07 RX ORDER — LIDOCAINE HYDROCHLORIDE AND EPINEPHRINE BITARTRATE 20; .01 MG/ML; MG/ML
INJECTION, SOLUTION SUBCUTANEOUS PRN
Status: DISCONTINUED | OUTPATIENT
Start: 2019-01-07 | End: 2019-01-07 | Stop reason: HOSPADM

## 2019-01-07 RX ORDER — HYDROCODONE BITARTRATE AND ACETAMINOPHEN 5; 325 MG/1; MG/1
1 TABLET ORAL EVERY 4 HOURS PRN
Qty: 42 TABLET | Refills: 0 | Status: SHIPPED | OUTPATIENT
Start: 2019-01-07 | End: 2019-01-14

## 2019-01-07 RX ORDER — SODIUM CHLORIDE 0.9 % (FLUSH) 0.9 %
10 SYRINGE (ML) INJECTION EVERY 12 HOURS SCHEDULED
Status: DISCONTINUED | OUTPATIENT
Start: 2019-01-07 | End: 2019-01-07 | Stop reason: HOSPADM

## 2019-01-07 RX ORDER — HYDROCODONE BITARTRATE AND ACETAMINOPHEN 5; 325 MG/1; MG/1
2 TABLET ORAL EVERY 6 HOURS PRN
Qty: 56 TABLET | Refills: 0 | Status: SHIPPED | OUTPATIENT
Start: 2019-01-07 | End: 2019-01-14

## 2019-01-07 RX ORDER — PROMETHAZINE HYDROCHLORIDE 25 MG/ML
6.25 INJECTION, SOLUTION INTRAMUSCULAR; INTRAVENOUS
Status: DISCONTINUED | OUTPATIENT
Start: 2019-01-07 | End: 2019-01-07 | Stop reason: HOSPADM

## 2019-01-07 RX ORDER — SODIUM CHLORIDE 0.9 % (FLUSH) 0.9 %
10 SYRINGE (ML) INJECTION PRN
Status: DISCONTINUED | OUTPATIENT
Start: 2019-01-07 | End: 2019-01-07 | Stop reason: HOSPADM

## 2019-01-07 RX ORDER — FENTANYL CITRATE 50 UG/ML
INJECTION, SOLUTION INTRAMUSCULAR; INTRAVENOUS PRN
Status: DISCONTINUED | OUTPATIENT
Start: 2019-01-07 | End: 2019-01-07 | Stop reason: SDUPTHER

## 2019-01-07 RX ORDER — CEPHALEXIN 500 MG/1
500 CAPSULE ORAL EVERY 12 HOURS SCHEDULED
Qty: 20 CAPSULE | Refills: 0 | Status: SHIPPED | OUTPATIENT
Start: 2019-01-07 | End: 2019-01-17

## 2019-01-07 RX ORDER — NEOSTIGMINE METHYLSULFATE 1 MG/ML
INJECTION, SOLUTION INTRAVENOUS PRN
Status: DISCONTINUED | OUTPATIENT
Start: 2019-01-07 | End: 2019-01-07 | Stop reason: SDUPTHER

## 2019-01-07 RX ORDER — TIZANIDINE 4 MG/1
4 TABLET ORAL EVERY 6 HOURS PRN
Status: DISCONTINUED | OUTPATIENT
Start: 2019-01-07 | End: 2019-01-07 | Stop reason: HOSPADM

## 2019-01-07 RX ORDER — CEPHALEXIN 500 MG/1
500 CAPSULE ORAL EVERY 12 HOURS SCHEDULED
Status: DISCONTINUED | OUTPATIENT
Start: 2019-01-07 | End: 2019-01-07 | Stop reason: HOSPADM

## 2019-01-07 RX ORDER — MORPHINE SULFATE 2 MG/ML
2 INJECTION, SOLUTION INTRAMUSCULAR; INTRAVENOUS
Status: DISCONTINUED | OUTPATIENT
Start: 2019-01-07 | End: 2019-01-07 | Stop reason: DRUGHIGH

## 2019-01-07 RX ORDER — ONDANSETRON 2 MG/ML
INJECTION INTRAMUSCULAR; INTRAVENOUS PRN
Status: DISCONTINUED | OUTPATIENT
Start: 2019-01-07 | End: 2019-01-07 | Stop reason: SDUPTHER

## 2019-01-07 RX ORDER — BUPIVACAINE HYDROCHLORIDE 2.5 MG/ML
INJECTION, SOLUTION EPIDURAL; INFILTRATION; INTRACAUDAL PRN
Status: DISCONTINUED | OUTPATIENT
Start: 2019-01-07 | End: 2019-01-07 | Stop reason: HOSPADM

## 2019-01-07 RX ORDER — MEPERIDINE HYDROCHLORIDE 50 MG/ML
12.5 INJECTION INTRAMUSCULAR; INTRAVENOUS; SUBCUTANEOUS EVERY 5 MIN PRN
Status: DISCONTINUED | OUTPATIENT
Start: 2019-01-07 | End: 2019-01-07 | Stop reason: HOSPADM

## 2019-01-07 RX ORDER — PROPOFOL 10 MG/ML
INJECTION, EMULSION INTRAVENOUS PRN
Status: DISCONTINUED | OUTPATIENT
Start: 2019-01-07 | End: 2019-01-07 | Stop reason: SDUPTHER

## 2019-01-07 RX ORDER — VANCOMYCIN HYDROCHLORIDE 500 MG/10ML
INJECTION, POWDER, LYOPHILIZED, FOR SOLUTION INTRAVENOUS PRN
Status: DISCONTINUED | OUTPATIENT
Start: 2019-01-07 | End: 2019-01-07 | Stop reason: HOSPADM

## 2019-01-07 RX ORDER — LIDOCAINE HYDROCHLORIDE 20 MG/ML
INJECTION, SOLUTION EPIDURAL; INFILTRATION; INTRACAUDAL; PERINEURAL PRN
Status: DISCONTINUED | OUTPATIENT
Start: 2019-01-07 | End: 2019-01-07 | Stop reason: SDUPTHER

## 2019-01-07 RX ORDER — HYDROCODONE BITARTRATE AND ACETAMINOPHEN 5; 325 MG/1; MG/1
2 TABLET ORAL EVERY 6 HOURS PRN
Status: DISCONTINUED | OUTPATIENT
Start: 2019-01-07 | End: 2019-01-07 | Stop reason: HOSPADM

## 2019-01-07 RX ORDER — MORPHINE SULFATE 4 MG/ML
4 INJECTION, SOLUTION INTRAMUSCULAR; INTRAVENOUS
Status: DISCONTINUED | OUTPATIENT
Start: 2019-01-07 | End: 2019-01-07 | Stop reason: HOSPADM

## 2019-01-07 RX ORDER — MORPHINE SULFATE 2 MG/ML
2 INJECTION, SOLUTION INTRAMUSCULAR; INTRAVENOUS EVERY 4 HOURS PRN
Status: DISCONTINUED | OUTPATIENT
Start: 2019-01-07 | End: 2019-01-07 | Stop reason: HOSPADM

## 2019-01-07 RX ORDER — SODIUM CHLORIDE 9 MG/ML
INJECTION, SOLUTION INTRAVENOUS CONTINUOUS PRN
Status: DISCONTINUED | OUTPATIENT
Start: 2019-01-07 | End: 2019-01-07 | Stop reason: SDUPTHER

## 2019-01-07 RX ORDER — DEXAMETHASONE SODIUM PHOSPHATE 10 MG/ML
INJECTION, SOLUTION INTRAMUSCULAR; INTRAVENOUS PRN
Status: DISCONTINUED | OUTPATIENT
Start: 2019-01-07 | End: 2019-01-07 | Stop reason: SDUPTHER

## 2019-01-07 RX ORDER — TIZANIDINE 4 MG/1
4 TABLET ORAL EVERY 6 HOURS PRN
Qty: 120 TABLET | Refills: 0 | Status: SHIPPED | OUTPATIENT
Start: 2019-01-07 | End: 2020-01-07

## 2019-01-07 RX ORDER — GLYCOPYRROLATE 1 MG/5 ML
SYRINGE (ML) INTRAVENOUS PRN
Status: DISCONTINUED | OUTPATIENT
Start: 2019-01-07 | End: 2019-01-07 | Stop reason: SDUPTHER

## 2019-01-07 RX ORDER — HYDRALAZINE HYDROCHLORIDE 20 MG/ML
5 INJECTION INTRAMUSCULAR; INTRAVENOUS EVERY 10 MIN PRN
Status: DISCONTINUED | OUTPATIENT
Start: 2019-01-07 | End: 2019-01-07 | Stop reason: HOSPADM

## 2019-01-07 RX ORDER — ROCURONIUM BROMIDE 10 MG/ML
INJECTION, SOLUTION INTRAVENOUS PRN
Status: DISCONTINUED | OUTPATIENT
Start: 2019-01-07 | End: 2019-01-07 | Stop reason: SDUPTHER

## 2019-01-07 RX ORDER — SODIUM CHLORIDE 9 MG/ML
INJECTION, SOLUTION INTRAVENOUS CONTINUOUS
Status: DISCONTINUED | OUTPATIENT
Start: 2019-01-07 | End: 2019-01-07 | Stop reason: HOSPADM

## 2019-01-07 RX ORDER — HYDROCODONE BITARTRATE AND ACETAMINOPHEN 5; 325 MG/1; MG/1
1 TABLET ORAL EVERY 4 HOURS PRN
Status: DISCONTINUED | OUTPATIENT
Start: 2019-01-07 | End: 2019-01-07 | Stop reason: HOSPADM

## 2019-01-07 RX ORDER — TIZANIDINE 4 MG/1
4 TABLET ORAL EVERY 6 HOURS PRN
Qty: 30 TABLET | Refills: 0 | Status: SHIPPED | OUTPATIENT
Start: 2019-01-07 | End: 2020-10-21

## 2019-01-07 RX ORDER — LABETALOL HYDROCHLORIDE 5 MG/ML
5 INJECTION, SOLUTION INTRAVENOUS EVERY 10 MIN PRN
Status: DISCONTINUED | OUTPATIENT
Start: 2019-01-07 | End: 2019-01-07 | Stop reason: HOSPADM

## 2019-01-07 RX ADMIN — SODIUM CHLORIDE: 9 INJECTION, SOLUTION INTRAVENOUS at 12:49

## 2019-01-07 RX ADMIN — FENTANYL CITRATE 100 MCG: 50 INJECTION, SOLUTION INTRAMUSCULAR; INTRAVENOUS at 14:26

## 2019-01-07 RX ADMIN — LIDOCAINE HYDROCHLORIDE 100 MG: 20 INJECTION, SOLUTION EPIDURAL; INFILTRATION; INTRACAUDAL; PERINEURAL at 14:26

## 2019-01-07 RX ADMIN — Medication 0.6 MG: at 15:45

## 2019-01-07 RX ADMIN — SODIUM CHLORIDE: 9 INJECTION, SOLUTION INTRAVENOUS at 14:21

## 2019-01-07 RX ADMIN — Medication 3 MG: at 15:45

## 2019-01-07 RX ADMIN — ONDANSETRON HYDROCHLORIDE 4 MG: 2 INJECTION, SOLUTION INTRAMUSCULAR; INTRAVENOUS at 15:45

## 2019-01-07 RX ADMIN — PROPOFOL 130 MG: 10 INJECTION, EMULSION INTRAVENOUS at 14:26

## 2019-01-07 RX ADMIN — ROCURONIUM BROMIDE 30 MG: 10 INJECTION INTRAVENOUS at 14:26

## 2019-01-07 RX ADMIN — DEXAMETHASONE SODIUM PHOSPHATE 10 MG: 10 INJECTION INTRAMUSCULAR; INTRAVENOUS at 14:39

## 2019-01-07 RX ADMIN — Medication 2 G: at 14:36

## 2019-01-07 ASSESSMENT — PULMONARY FUNCTION TESTS
PIF_VALUE: 22
PIF_VALUE: 22
PIF_VALUE: 24
PIF_VALUE: 25
PIF_VALUE: 3
PIF_VALUE: 20
PIF_VALUE: 3
PIF_VALUE: 22
PIF_VALUE: 23
PIF_VALUE: 24
PIF_VALUE: 24
PIF_VALUE: 2
PIF_VALUE: 22
PIF_VALUE: 0
PIF_VALUE: 23
PIF_VALUE: 25
PIF_VALUE: 23
PIF_VALUE: 19
PIF_VALUE: 22
PIF_VALUE: 25
PIF_VALUE: 23
PIF_VALUE: 22
PIF_VALUE: 3
PIF_VALUE: 3
PIF_VALUE: 2
PIF_VALUE: 24
PIF_VALUE: 24
PIF_VALUE: 25
PIF_VALUE: 24
PIF_VALUE: 23
PIF_VALUE: 2
PIF_VALUE: 22
PIF_VALUE: 11
PIF_VALUE: 24
PIF_VALUE: 22
PIF_VALUE: 24
PIF_VALUE: 1
PIF_VALUE: 24
PIF_VALUE: 21
PIF_VALUE: 22
PIF_VALUE: 24
PIF_VALUE: 25
PIF_VALUE: 25
PIF_VALUE: 19
PIF_VALUE: 24
PIF_VALUE: 23
PIF_VALUE: 24
PIF_VALUE: 25
PIF_VALUE: 24
PIF_VALUE: 23
PIF_VALUE: 24
PIF_VALUE: 3
PIF_VALUE: 24
PIF_VALUE: 3
PIF_VALUE: 3
PIF_VALUE: 2
PIF_VALUE: 24
PIF_VALUE: 3
PIF_VALUE: 21
PIF_VALUE: 1
PIF_VALUE: 24
PIF_VALUE: 2
PIF_VALUE: 22
PIF_VALUE: 2
PIF_VALUE: 24
PIF_VALUE: 23
PIF_VALUE: 24
PIF_VALUE: 24
PIF_VALUE: 23
PIF_VALUE: 23
PIF_VALUE: 24
PIF_VALUE: 23
PIF_VALUE: 25
PIF_VALUE: 22
PIF_VALUE: 2
PIF_VALUE: 0
PIF_VALUE: 22
PIF_VALUE: 24
PIF_VALUE: 24
PIF_VALUE: 15
PIF_VALUE: 1
PIF_VALUE: 3
PIF_VALUE: 20
PIF_VALUE: 24
PIF_VALUE: 22
PIF_VALUE: 2
PIF_VALUE: 25

## 2019-01-07 ASSESSMENT — PAIN SCALES - GENERAL
PAINLEVEL_OUTOF10: 0

## 2019-01-07 ASSESSMENT — PAIN - FUNCTIONAL ASSESSMENT: PAIN_FUNCTIONAL_ASSESSMENT: 0-10

## 2019-01-07 ASSESSMENT — PAIN DESCRIPTION - DESCRIPTORS: DESCRIPTORS: ACHING;DISCOMFORT

## 2019-02-06 ENCOUNTER — OFFICE VISIT (OUTPATIENT)
Dept: NEUROSURGERY | Age: 79
End: 2019-02-06

## 2019-02-06 VITALS
WEIGHT: 167 LBS | DIASTOLIC BLOOD PRESSURE: 72 MMHG | HEIGHT: 61 IN | HEART RATE: 104 BPM | SYSTOLIC BLOOD PRESSURE: 119 MMHG | BODY MASS INDEX: 31.53 KG/M2

## 2019-02-06 DIAGNOSIS — R52 PAIN: Primary | ICD-10-CM

## 2019-02-06 PROCEDURE — 99024 POSTOP FOLLOW-UP VISIT: CPT | Performed by: PHYSICIAN ASSISTANT

## 2019-03-20 ENCOUNTER — TELEPHONE (OUTPATIENT)
Dept: NEUROSURGERY | Age: 79
End: 2019-03-20

## 2019-03-20 DIAGNOSIS — T85.192S MALFUNCTION OF SPINAL CORD STIMULATOR, SEQUELA: Primary | ICD-10-CM

## 2019-05-12 DIAGNOSIS — T85.192S MALFUNCTION OF SPINAL CORD STIMULATOR, SEQUELA: ICD-10-CM

## 2019-06-07 ENCOUNTER — TELEPHONE (OUTPATIENT)
Dept: NEUROSURGERY | Age: 79
End: 2019-06-07

## 2019-06-07 DIAGNOSIS — M54.40 ACUTE RIGHT-SIDED LOW BACK PAIN WITH SCIATICA, SCIATICA LATERALITY UNSPECIFIED: Primary | ICD-10-CM

## 2019-06-10 ENCOUNTER — TELEPHONE (OUTPATIENT)
Dept: NEUROSURGERY | Age: 79
End: 2019-06-10

## 2019-06-13 ENCOUNTER — TELEPHONE (OUTPATIENT)
Dept: NEUROSURGERY | Age: 79
End: 2019-06-13

## 2019-07-19 DIAGNOSIS — Z96.89 SPINAL CORD STIMULATOR STATUS: ICD-10-CM

## 2019-07-19 DIAGNOSIS — M54.6 CHRONIC RIGHT-SIDED THORACIC BACK PAIN: Primary | ICD-10-CM

## 2019-07-19 DIAGNOSIS — G89.29 CHRONIC RIGHT-SIDED THORACIC BACK PAIN: Primary | ICD-10-CM

## 2019-07-23 ENCOUNTER — OFFICE VISIT (OUTPATIENT)
Dept: PAIN MANAGEMENT | Age: 79
End: 2019-07-23
Payer: MEDICARE

## 2019-07-23 VITALS
TEMPERATURE: 97.6 F | WEIGHT: 167 LBS | HEIGHT: 61 IN | RESPIRATION RATE: 16 BRPM | BODY MASS INDEX: 31.53 KG/M2 | HEART RATE: 86 BPM | DIASTOLIC BLOOD PRESSURE: 68 MMHG | OXYGEN SATURATION: 98 % | SYSTOLIC BLOOD PRESSURE: 124 MMHG

## 2019-07-23 DIAGNOSIS — Z96.89 SPINAL CORD STIMULATOR STATUS: ICD-10-CM

## 2019-07-23 DIAGNOSIS — G89.4 CHRONIC PAIN SYNDROME: Primary | ICD-10-CM

## 2019-07-23 DIAGNOSIS — M54.6 CHRONIC RIGHT-SIDED THORACIC BACK PAIN: ICD-10-CM

## 2019-07-23 DIAGNOSIS — B02.29 POSTHERPETIC NEURALGIA: ICD-10-CM

## 2019-07-23 DIAGNOSIS — G89.29 CHRONIC RIGHT-SIDED THORACIC BACK PAIN: ICD-10-CM

## 2019-07-23 PROCEDURE — 99213 OFFICE O/P EST LOW 20 MIN: CPT | Performed by: PAIN MEDICINE

## 2019-07-23 NOTE — PROGRESS NOTES
[address]  [address] [facility], [address] [place] [address] [place]  [phone]    Follow up Note      Evgeny Locks     Date of Visit:  7/23/2019    CC:  Patient presents for follow up   Chief Complaint   Patient presents with    Follow-up     Problems with SCS      HPI:    Pain is worse. Change in quality of symptoms:yes - now having a \"jagging\" in the right thoracic region radiating into the rt breast (occurs both with and without the SCS on), began after she suffered a fall which fractured her rt shoulder requiring replacement. Medication side effects:none. Recent diagnostic testing:  Recent interventional procedures:right shoulder replacement    She has been on anticoagulation medications to include ASA 81 mg and has not been on herbal supplements. She is not diabetic.     Imaging:   Thoracic MRI 9/2018 -   FINDINGS: Multiplanar, multisequence MR images of the thoracic spine. Images degraded by artifact.       Thoracic vertebral height and alignment intact. No acute osseous   signal and mallet. Thoracic spinal cord exhibits unremarkable caliber   and signal. Within the exam limits, no disc herniation or central   spinal canal impingement. Lumbar xray 2018 -   1. Osteoporosis and rotoscoliosis. 2. Multilevel degenerative disc disease. 3. Disc narrowing at L3-4 is seen. Thoracic xray 2018 -   1. Osteoporosis and rotoscoliosis. 2. Multilevel disc narrowing with only minimal spurring.    3. No fracture, subluxation or destructive lesion.      Previous treatments: Acupuncture, Nerve block (intercostal), Epidural Steroid Injection x5 and medications ((gabapentin, nortriptyline, lidocaine patches and cream, prednisone, capsaicin cream, tramadol, duloxetine, cyclobenzaprine, Lyrica 50 mg and 100 mg, aspercream/lidocaine, cymbalta, fentanyl patches 12 mcg/hr and 25 mcg/hr, oxycodone 5 mg and 10 mg, carbamazepine (caused staggering and double vision), terrasil cream)) - the medications sometimes work for a short time then stop working    Potential Aberrant Drug-Related Behavior: no     Urine Drug Screenin2018 consistent    OARRS report:  10/2018 consistent  2018 consistent    Past Medical History:   Diagnosis Date    Hiatal hernia     per chest x ray report     Hyperlipidemia     Hypertension        Past Surgical History:   Procedure Laterality Date    BLADDER SURGERY      prolasped    BREAST SURGERY      CATARACT REMOVAL WITH IMPLANT Bilateral     CYST REMOVAL      2 removed from right breast    KNEE ARTHROSCOPY      x2    OTHER SURGICAL HISTORY  10/30/2018    thoracic spinal cord stimulator trial with [employer]    PARTIAL HYSTERECTOMY      VT PERCUT IMPLNT NEUROELECT,EPIDURAL N/A 10/30/2018    THORACIC SPINAL CORD STIMULATOR TRIAL [employer] performed by Yesenia Dugan DO at [facility] N/A 2019    T4 SPINAL CORD STIMULATOR PLACEMENT ---Randye Hash TABLE, [employer] performed by Tay Gan MD at [address] [place] [address]         Prior to Admission medications    Medication Sig Start Date End Date Taking?  Authorizing Provider   tiZANidine (ZANAFLEX) 4 MG tablet Take 1 tablet by mouth every 6 hours as needed (muscle relaxer) 19  Yes EVANGELINA Paiz   tiZANidine (ZANAFLEX) 4 MG tablet Take 1 tablet by mouth every 6 hours as needed (spasm) 19 Yes Tay Gan MD   FLUZONE HIGH-DOSE 0.5 ML MOE injection administer one injection as directed 18  Yes Historical Provider, MD   ASPERCREME LIDOCAINE EX Apply topically as needed   Yes Historical Provider, MD   Acetaminophen (TYLENOL EXTRA STRENGTH PO) Take by mouth   Yes Historical Provider, MD   nortriptyline (PAMELOR) 25 MG capsule nortriptyline 25 mg capsule   1 CAP PO HS, INSTR: INCREASE AS DIRECTED TO 3 AT BEDTIME IF NEEDED FOR SHINGLES   Yes Historical Provider, MD   hydrochlorothiazide (MICROZIDE) 12.5

## 2019-07-23 NOTE — PROGRESS NOTES
Rosalina Damian presents to the [facility] on 7/23/2019. Deepa Phillips is complaining of pain back/rt. Anterior thoracic region. The pain is constant. The pain is described as aching/stabbing. Pain is rated on her best day at a 5, on her worst day at a 10, and on average at a 6 on the VAS scale. She took her last dose of     Any procedures since your last visit:     Pacemaker or defibrilator: No managed by     She has been on anticoagulation medications to include ASA and is managed by PCP     /68   Pulse 86   Temp 97.6 °F (36.4 °C) (Oral)   Resp 16   Ht 5' 1\" (1.549 m)   Wt 167 lb (75.8 kg)   SpO2 98%   BMI 31.55 kg/m²      No LMP recorded. Patient has had a hysterectomy.

## 2019-10-10 ENCOUNTER — TELEPHONE (OUTPATIENT)
Dept: ADMINISTRATIVE | Age: 79
End: 2019-10-10

## 2020-09-16 NOTE — PROGRESS NOTES
[facility]  [address] [facility], [address] [place] [address]  [phone]    Follow up Note      Janetteluisa Noel     Date of Visit:  9/17/2020    CC:  Patient presents for follow up   Chief Complaint   Patient presents with    Pain     under right breast shoulder in the back     HPI:    Pain is worse. Change in quality of symptoms:yes - for past few weeks having intermittent pain not controlled by SCS in same distribution of PHN. Medication side effects:none. Recent diagnostic testing:  Recent interventional procedures:none since last visit. She has been on anticoagulation medications to include ASA 81 mg and has not been on herbal supplements. She is not diabetic.     Imaging:   Thoracic MRI 9/2018 -   FINDINGS: Multiplanar, multisequence MR images of the thoracic spine. Images degraded by artifact.       Thoracic vertebral height and alignment intact. No acute osseous   signal and mallet. Thoracic spinal cord exhibits unremarkable caliber   and signal. Within the exam limits, no disc herniation or central   spinal canal impingement. Lumbar xray 2018 -   1. Osteoporosis and rotoscoliosis. 2. Multilevel degenerative disc disease. 3. Disc narrowing at L3-4 is seen. Thoracic xray 2018 -   1. Osteoporosis and rotoscoliosis. 2. Multilevel disc narrowing with only minimal spurring.    3. No fracture, subluxation or destructive lesion.      Previous treatments: Acupuncture, Nerve block (intercostal), Epidural Steroid Injection x5 and medications ((gabapentin, nortriptyline, lidocaine patches and cream, prednisone, capsaicin cream, tramadol, duloxetine, cyclobenzaprine, Lyrica 50 mg and 100 mg, aspercream/lidocaine, cymbalta, fentanyl patches 12 mcg/hr and 25 mcg/hr, oxycodone 5 mg and 10 mg, carbamazepine (caused staggering and double vision), terrasil cream)) - the medications sometimes work for a short time then stop working    Potential Aberrant Drug-Related Behavior: no     Urine Drug Screenin2018 consistent    OARRS report:  10/2018 consistent  2018 consistent    Past Medical History:   Diagnosis Date    Hiatal hernia     per chest x ray report     Hyperlipidemia     Hypertension        Past Surgical History:   Procedure Laterality Date    BLADDER SURGERY      prolasped    BREAST SURGERY      CATARACT REMOVAL WITH IMPLANT Bilateral     CYST REMOVAL      2 removed from right breast    KNEE ARTHROSCOPY      x2    OTHER SURGICAL HISTORY  10/30/2018    thoracic spinal cord stimulator trial with [employer]    PARTIAL HYSTERECTOMY      [place] PERCUT IMPLNT NEUROELECT,EPIDURAL N/A 10/30/2018    THORACIC SPINAL CORD STIMULATOR TRIAL [employer] performed by Corwin Raymond DO at [facility] N/A 2019    T4 SPINAL CORD STIMULATOR PLACEMENT ---Cheikhanaterri [place] TABLE, [employer] performed by Navneet Jamil MD at [address] [place] [address]         Prior to Admission medications    Medication Sig Start Date End Date Taking?  Authorizing Provider   tiZANidine (ZANAFLEX) 4 MG tablet Take 1 tablet by mouth every 6 hours as needed (muscle relaxer) 19  Yes EVANGELINA Esqueda   FLUZONE HIGH-DOSE 0.5 ML MOE injection administer one injection as directed 18  Yes Historical Provider, MD   ASPERCREME LIDOCAINE EX Apply topically as needed   Yes Historical Provider, MD   Acetaminophen (TYLENOL EXTRA STRENGTH PO) Take by mouth   Yes Historical Provider, MD   nortriptyline (PAMELOR) 25 MG capsule nortriptyline 25 mg capsule   1 CAP PO HS, INSTR: INCREASE AS DIRECTED TO 3 AT BEDTIME IF NEEDED FOR SHINGLES   Yes Historical Provider, MD   hydrochlorothiazide (MICROZIDE) 12.5 MG capsule Take 12.5 mg by mouth daily PCP INSTRUCTED HER TO STOP   Yes Historical Provider, MD   lisinopril (PRINIVIL;ZESTRIL) 2.5 MG tablet Take 2.5 mg by mouth daily   Yes Historical Provider, MD   pravastatin (PRAVACHOL) 40 MG tablet Take 40 mg by mouth daily   Yes Historical Provider, MD   niacin 500 MG extended release capsule Take 500 mg by mouth nightly   Yes Historical Provider, MD   omeprazole (PRILOSEC) 20 MG delayed release capsule Take 20 mg by mouth daily   Yes Historical Provider, MD       No Known Allergies    Social History     Socioeconomic History    Marital status:      Spouse name: Not on file    Number of children: Not on file    Years of education: Not on file    Highest education level: Not on file   Occupational History    Not on file   Social Needs    Financial resource strain: Not on file    Food insecurity     Worry: Not on file     Inability: Not on file    Transportation needs     Medical: Not on file     Non-medical: Not on file   Tobacco Use    Smoking status: Former Smoker    Smokeless tobacco: Never Used   Substance and Sexual Activity    Alcohol use:  Yes     Alcohol/week: 1.0 standard drinks     Types: 1 Cans of beer per week     Comment: RARE    Drug use: No    Sexual activity: Not on file   Lifestyle    Physical activity     Days per week: Not on file     Minutes per session: Not on file    Stress: Not on file   Relationships    Social connections     Talks on phone: Not on file     Gets together: Not on file     Attends Oriental orthodox service: Not on file     Active member of club or organization: Not on file     Attends meetings of clubs or organizations: Not on file     Relationship status: Not on file    Intimate partner violence     Fear of current or ex partner: Not on file     Emotionally abused: Not on file     Physically abused: Not on file     Forced sexual activity: Not on file   Other Topics Concern    Not on file   Social History Narrative    Not on file       Family History   Problem Relation Age of Onset    Cancer Other     Diabetes Other     Heart Disease Other        REVIEW OF SYSTEMS:     Merrick Shankar denies fever/chills, chest pain, shortness of breath, new bowel or bladder complaints. All other review of systems was negative. PHYSICAL EXAMINATION:      /72   Pulse 66   Temp 97.2 °F (36.2 °C)   Resp 18   Ht 5' 1\" (1.549 m)   Wt 172 lb (78 kg)   SpO2 96%   BMI 32.50 kg/m²     General:       General appearance:pleasant and well-hydrated, in no distress and A & O x3  Build:Normal Weight  Function:Rises from a seated position easily.     HEENT:     Head:normocephalic, atraumatic  Pupils:regular, round, equal  Sclera: icterus absent     Lungs:     Breathing:normal breathing pattern     Abdomen:     Shape:non-distended and normal  Tenderness:none  Guarding:none      Thoracic spine:     Spine inspection:healed hypopigmented lesions at approximately T8,9 level on the right  Palpation: nontender paraspinals, right, approx T8,9 level anteriorly.   Range of motion:normal in flexion, extension rotation bilateral and is not painful.     Musculoskeletal:     Trigger points in trapezius:absent bilaterally  Trigger points in rhomboids:absent bilaterally  Trigger points in Paraveteral:absent bilaterally  Trigger points in supraspinatus/infraspinatus:absent  SI joint tenderness:negative right, negative left                Extremities:     Tremors:None bilaterally upper and lower  Intact:Yes  Varicose veins:absent   Pulses:present Lt radial  Cyanosis:none  Edema:none x all 4 extremities     Neurological:     Sensory:normal to light touch BLE, hyperalgesia right T8,9 dermatomes resolved     Motor:                     Right Quadriceps5/5          Left Quadriceps5/5           Right Gastrocnemius5/5    Left Gastrocnemius5/5  Right Ant Tibialis5/5  Left Ant Tibialis5/5     Gait:normal     Dermatology:      Skin: healed hypopigmented lesions at approximately T8,9 level on the right     Assessment/Plan:  Right T8,9 dermatomal pain due to post herpetic neuralgia treated with [facility] [employer] SCS implant (trial done by Dr. Amaya Bills, implant by Dr. Ravin Bright)    A few weeks ago developed intermittent pain in same region as PHN that is not controlled by SCS  Currently not having this pain      [employer] rep here doing reprogramming today, will have patient f/u in 4-6 weeks to see if pain better controlled  No need for thoracic xrays at this time as she has had not recent trauma  [employer] rep will continue to communicate with me in regards to patient's progress  Patient encouraged to stay active   Treatment plan discussed with the patient including potential medication side effects    Cc:  Referring physician    BALDEMAR Tejeda.

## 2020-09-17 ENCOUNTER — OFFICE VISIT (OUTPATIENT)
Dept: PAIN MANAGEMENT | Age: 80
End: 2020-09-17
Payer: MEDICARE

## 2020-09-17 VITALS
WEIGHT: 172 LBS | HEART RATE: 66 BPM | DIASTOLIC BLOOD PRESSURE: 72 MMHG | OXYGEN SATURATION: 96 % | SYSTOLIC BLOOD PRESSURE: 124 MMHG | HEIGHT: 61 IN | RESPIRATION RATE: 18 BRPM | BODY MASS INDEX: 32.47 KG/M2 | TEMPERATURE: 97.2 F

## 2020-09-17 PROCEDURE — 99213 OFFICE O/P EST LOW 20 MIN: CPT | Performed by: PAIN MEDICINE

## 2020-09-17 NOTE — PROGRESS NOTES
Do you currently have any of the following:    Fever: No  Headache:  No  Cough: No  Shortness of breath: No  Exposed to anyone with these symptoms: No         Javed Cruise presents to the [facility] on 9/17/2020. Tung Silva is complaining of pain in the back . The pain is intermittent. The pain is described as aching, throbbing, shooting and stabbing. Pain is rated on her best day at a 1, on her worst day at a 8, and on average at a 1 on the VAS scale. She took her last dose of Tylenol . Still having problems with the SCS can not get the remote to work correct   Any procedures since your last visit: No, with none % relief. Pacemaker or defibrilator: No managed by none. She is not on NSAIDS and is not on anticoagulation medication  /72   Pulse 66   Temp 97.2 °F (36.2 °C)   Resp 18   Ht 5' 1\" (1.549 m)   Wt 172 lb (78 kg)   SpO2 96%   BMI 32.50 kg/m²      No LMP recorded. Patient has had a hysterectomy.

## 2020-10-21 ENCOUNTER — OFFICE VISIT (OUTPATIENT)
Dept: PAIN MANAGEMENT | Age: 80
End: 2020-10-21
Payer: MEDICARE

## 2020-10-21 VITALS
HEIGHT: 61 IN | WEIGHT: 172 LBS | BODY MASS INDEX: 32.47 KG/M2 | TEMPERATURE: 95.7 F | HEART RATE: 86 BPM | SYSTOLIC BLOOD PRESSURE: 118 MMHG | RESPIRATION RATE: 16 BRPM | DIASTOLIC BLOOD PRESSURE: 72 MMHG | OXYGEN SATURATION: 96 %

## 2020-10-21 PROCEDURE — 99213 OFFICE O/P EST LOW 20 MIN: CPT | Performed by: PHYSICIAN ASSISTANT

## 2020-10-21 NOTE — PROGRESS NOTES
[facility] Pain Management  [facility]  [facility], [facility]    Follow up Note      Dena Roman     Date of Visit:  10/21/2020    CC:  Patient presents for follow up   Chief Complaint   Patient presents with    Follow-up     under rt. breast        HPI:    Pain is unchanged. No new issues. Patient reports pain a little better after SCS rep turned up stimulator. Appropriate analgesia with current medications regimen: Not applicable. Change in quality of symptoms:no. Medication side effects:none. Recent diagnostic testing:none. Recent interventional procedures:none. She has been on anticoagulation medications to include ASA 81 mg and has not been on herbal supplements.  She is not diabetic.     Imaging:   Thoracic MRI 9/2018 -   FINDINGS: Multiplanar, multisequence MR images of the thoracic spine. Images degraded by artifact.       Thoracic vertebral height and alignment intact. No acute osseous   signal and mallet. Thoracic spinal cord exhibits unremarkable caliber   and signal. Within the exam limits, no disc herniation or central   spinal canal impingement.      Lumbar xray 2018 -   1. Osteoporosis and rotoscoliosis. 2. Multilevel degenerative disc disease. 3. Disc narrowing at L3-4 is seen.      Thoracic xray 2018 -   1. Osteoporosis and rotoscoliosis. 2. Multilevel disc narrowing with only minimal spurring.    3. No fracture, subluxation or destructive lesion.      Previous treatments: Acupuncture, Nerve block (intercostal), Epidural Steroid Injection x5 and medications ((gabapentin, nortriptyline, lidocaine patches and cream, prednisone, capsaicin cream, tramadol, duloxetine, cyclobenzaprine, Lyrica 50 mg and 100 mg, aspercream/lidocaine, cymbalta, fentanyl patches 12 mcg/hr and 25 mcg/hr, oxycodone 5 mg and 10 mg, carbamazepine (caused staggering and double vision), terrasil cream)) - the medications sometimes work for a short time then stop working     Potential Aberrant Drug-Related Behavior: no      Urine Drug Screenin2018 consistent     OARRS report:  10/2018 consistent  2018 consistent  10/2020 consistent       Past Medical History:   Diagnosis Date    Hiatal hernia     per chest x ray report     Hyperlipidemia     Hypertension        Past Surgical History:   Procedure Laterality Date    BLADDER SURGERY      prolasped    BREAST SURGERY      CATARACT REMOVAL WITH IMPLANT Bilateral     CYST REMOVAL      2 removed from right breast    KNEE ARTHROSCOPY      x2    OTHER SURGICAL HISTORY  10/30/2018    thoracic spinal cord stimulator trial with [employer]    PARTIAL HYSTERECTOMY      [place] PERCUT IMPLNT NEUROELECT,EPIDURAL N/A 10/30/2018    THORACIC SPINAL CORD STIMULATOR TRIAL [employer] performed by Hermelinda Butts DO at [facility] N/A 2019    T4 SPINAL CORD STIMULATOR PLACEMENT ---Demetrio Elia TABLE, [employer] performed by Kathryn Vergara MD at [address] [place] [address]         Prior to Admission medications    Medication Sig Start Date End Date Taking?  Authorizing Provider   ASPERCREME LIDOCAINE EX Apply topically as needed   Yes Historical Provider, MD   Acetaminophen (TYLENOL EXTRA STRENGTH PO) Take by mouth   Yes Historical Provider, MD   hydrochlorothiazide (MICROZIDE) 12.5 MG capsule Take 12.5 mg by mouth daily PCP INSTRUCTED HER TO STOP   Yes Historical Provider, MD   lisinopril (PRINIVIL;ZESTRIL) 2.5 MG tablet Take 2.5 mg by mouth daily   Yes Historical Provider, MD   pravastatin (PRAVACHOL) 40 MG tablet Take 40 mg by mouth daily   Yes Historical Provider, MD   omeprazole (PRILOSEC) 20 MG delayed release capsule Take 20 mg by mouth daily   Yes Historical Provider, MD   FLUZONE HIGH-DOSE 0.5 ML MOE injection administer one injection as directed 18   Historical Provider, MD       No Known Allergies    Social History     Socioeconomic History    Marital status:  Spouse name: Not on file    Number of children: Not on file    Years of education: Not on file    Highest education level: Not on file   Occupational History    Not on file   Social Needs    Financial resource strain: Not on file    Food insecurity     Worry: Not on file     Inability: Not on file    Transportation needs     Medical: Not on file     Non-medical: Not on file   Tobacco Use    Smoking status: Former Smoker    Smokeless tobacco: Never Used   Substance and Sexual Activity    Alcohol use: Yes     Alcohol/week: 1.0 standard drinks     Types: 1 Cans of beer per week     Comment: RARE    Drug use: No    Sexual activity: Not on file   Lifestyle    Physical activity     Days per week: Not on file     Minutes per session: Not on file    Stress: Not on file   Relationships    Social connections     Talks on phone: Not on file     Gets together: Not on file     Attends Bahai service: Not on file     Active member of club or organization: Not on file     Attends meetings of clubs or organizations: Not on file     Relationship status: Not on file    Intimate partner violence     Fear of current or ex partner: Not on file     Emotionally abused: Not on file     Physically abused: Not on file     Forced sexual activity: Not on file   Other Topics Concern    Not on file   Social History Narrative    Not on file       Family History   Problem Relation Age of Onset    Cancer Other     Diabetes Other     Heart Disease Other        REVIEW OF SYSTEMS:     Adrianne Vaughan denies fever/chills, chest pain, shortness of breath, new bowel or bladder complaints. All other review of systems was negative. PHYSICAL EXAMINATION:      /72   Pulse 86   Temp 95.7 °F (35.4 °C) (Oral)   Resp 16   Ht 5' 1\" (1.549 m)   Wt 172 lb (78 kg)   SpO2 96%   BMI 32.50 kg/m²     General:      General appearance:   pleasant and well-hydrated.    , in no discomfort and A & O x3  Build:Overweight    HEENT:    Head:normocephalic and atraumatic  Sclera: icterus absent,    Lungs:    Breathing:Normal expansion. No rales, rhonchi, or wheezing. Abdomen:    Shape:non-distended and normal      Thoracic spine:    TTP to dermatome region affected     Extremities:    Tremors:None bilaterally upper and lower  Range of motion:Generally normal shoulders, pain with internal rotation of hips not done. Intact:Yes  Edema:Normal    Neurological:    [address]    Dermatology:    Skin:no unusual rashes, no skin lesions, no palpable subcutaneous nodules and good skin turgor    Impression:    Right T8,9 dermatomal pain due to post herpetic neuralgia treated with [facility] [employer] SCS implant (trial done by Dr. Addie Schwartz, implant by Dr. Kristina Beatty)     A few weeks ago developed intermittent pain in same region as PHN that is not controlled by SCS. Patient reports pain unchanged. Reports a little better after the rep adjusted her stimulator today. Patient leaving for [place] on 10/25 and returning in May. She has a number to contact the rep in [place] with any issues. [employer] rep here doing reprogramming today  No need for thoracic xrays at this time as she has had not recent trauma  [employer] rep will continue to communicate with me in regards to patient's progress  Patient encouraged to stay active   Treatment plan discussed with the patient including potential medication side effects      Controlled Substance Monitoring:    Acute and Chronic Pain Monitoring:   RX Monitoring 10/21/2020   Periodic Controlled Substance Monitoring No signs of potential drug abuse or diversion identified. ;Assessed functional status. We discussed with the patient that combining opioids, benzodiazepines, alcohol, illicit drugs or sleep aids increases the risk of respiratory depression including death.  We discussed that these medications may cause drowsiness, sedation or dizziness and have counseled the patient not to drive or operate machinery. We have discussed that these medications will be prescribed only by one provider. We have discussed with the patient about age related risk factors and have thoroughly discussed the importance of taking these medications as prescribed. The patient verbalizes understanding.     ccreferring physic

## 2020-10-21 NOTE — PROGRESS NOTES
Do you currently have any of the following:    Fever: No  Headache:  No  Cough: No  Shortness of breath: No  Exposed to anyone with these symptoms: No         Trey Leung presents to the [facility] on 10/21/2020. Jessie Miles is complaining of pain under rt. breast  The pain is constant. The pain is described as aching. Pain is rated on her best day at a 2, on her worst day at a 10, and on average at a 4 on the VAS scale. She took her last dose of     Any procedures since your last visit:     Pacemaker or defibrilator: No managed by . She is not on NSAIDS and is not on anticoagulation medications to include none and is managed by    /72   Pulse 86   Temp 95.7 °F (35.4 °C) (Oral)   Resp 16   Ht 5' 1\" (1.549 m)   Wt 172 lb (78 kg)   SpO2 96%   BMI 32.50 kg/m²      No LMP recorded. Patient has had a hysterectomy.

## 2020-12-17 NOTE — PROGRESS NOTES
[facility]  [address]  [facility]    Follow up Note      Pedro Marin     Date of Visit:  12/18/2020    CC:  Patient presents for follow up   Chief Complaint   Patient presents with    Other     pain from shingles  no pain currently     Consent:  Telehealth Visit due to [place] 19 pandemic  The patient and/or health care decision maker is aware that he/she may receive a bill for this tele-health service Doxy Me, depending on his insurance coverage, and has provided verbal consent to proceed: Yes  I have considered the risks of abuse, dependence, addiction and diversion. My patient is aware that they will need a follow-up visit (in-person or virtually) at the appropriate time indicated for continued medications. Further, my patient is aware that when this acute crisis has lifted, they will be expected to return for an in-person visit and all elements of standard local and hospital guidelines in order to continue this medication. Patient Location:Home   Physician Location: Other address in [place]      HPI:    Pain is unchanged. No new issues. Patient reports pain a little better after SCS rep turned up stimulator. Appropriate analgesia with current medications regimen: Not applicable. Change in quality of symptoms:no. Medication side effects:none. Recent diagnostic testing:none. Recent interventional procedures:none. She has been on anticoagulation medications to include ASA 81 mg and has not been on herbal supplements.  She is not diabetic.     Imaging:   Thoracic MRI 9/2018 -   FINDINGS: Multiplanar, multisequence MR images of the thoracic spine. Images degraded by artifact.       Thoracic vertebral height and alignment intact. No acute osseous   signal and mallet. Thoracic spinal cord exhibits unremarkable caliber   and signal. Within the exam limits, no disc herniation or central   spinal canal impingement.      Lumbar xray 2018 -   1.  Osteoporosis and rotoscoliosis. 2. Multilevel degenerative disc disease. 3. Disc narrowing at L3-4 is seen.      Thoracic xray 2018 -   1. Osteoporosis and rotoscoliosis. 2. Multilevel disc narrowing with only minimal spurring. 3. No fracture, subluxation or destructive lesion.      Previous treatments: Acupuncture, Nerve block (intercostal), Epidural Steroid Injection x5 and medications ((gabapentin, nortriptyline, lidocaine patches and cream, prednisone, capsaicin cream, tramadol, duloxetine, cyclobenzaprine, Lyrica 50 mg and 100 mg, aspercream/lidocaine, cymbalta, fentanyl patches 12 mcg/hr and 25 mcg/hr, oxycodone 5 mg and 10 mg, carbamazepine (caused staggering and double vision), terrasil cream)) - the medications sometimes work for a short time then stop working     Potential Aberrant Drug-Related Behavior: no      Urine Drug Screenin2018 consistent     OARRS report:  10/2018 consistent  2018 consistent  10/2020 consistent       Past Medical History:   Diagnosis Date    Hiatal hernia     per chest x ray report     Hyperlipidemia     Hypertension        Past Surgical History:   Procedure Laterality Date    BLADDER SURGERY      prolasped    BREAST SURGERY      CATARACT REMOVAL WITH IMPLANT Bilateral     CYST REMOVAL      2 removed from right breast    KNEE ARTHROSCOPY      x2    OTHER SURGICAL HISTORY  10/30/2018    thoracic spinal cord stimulator trial with [employer]    PARTIAL HYSTERECTOMY      LA PERCUT IMPLNT NEUROELECT,EPIDURAL N/A 10/30/2018    THORACIC SPINAL CORD STIMULATOR TRIAL [employer] performed by Fernando Rivera DO at [address] N/A 2019    T4 SPINAL CORD STIMULATOR PLACEMENT ---Connye Eliezer TABLE, [employer] performed by Mars Jenkins MD at [address] [place] [address]         Prior to Admission medications    Medication Sig Start Date End Date Taking?  Authorizing Provider   FLUZONE HIGH-DOSE 0.5 ML MOE injection administer one injection as directed 12/26/18  Yes Historical Provider, MD Novak Roll topically as needed   Yes Historical Provider, MD   Acetaminophen (TYLENOL EXTRA STRENGTH PO) Take by mouth   Yes Historical Provider, MD   hydrochlorothiazide (MICROZIDE) 12.5 MG capsule Take 12.5 mg by mouth daily PCP INSTRUCTED HER TO STOP   Yes Historical Provider, MD   lisinopril (PRINIVIL;ZESTRIL) 2.5 MG tablet Take 2.5 mg by mouth daily   Yes Historical Provider, MD   pravastatin (PRAVACHOL) 40 MG tablet Take 40 mg by mouth daily   Yes Historical Provider, MD   omeprazole (PRILOSEC) 20 MG delayed release capsule Take 20 mg by mouth daily   Yes Historical Provider, MD       No Known Allergies    Social History     Socioeconomic History    Marital status:      Spouse name: Not on file    Number of children: Not on file    Years of education: Not on file    Highest education level: Not on file   Occupational History    Not on file   Social Needs    Financial resource strain: Not on file    Food insecurity     Worry: Not on file     Inability: Not on file    Transportation needs     Medical: Not on file     Non-medical: Not on file   Tobacco Use    Smoking status: Former Smoker    Smokeless tobacco: Never Used   Substance and Sexual Activity    Alcohol use:  Yes     Alcohol/week: 1.0 standard drinks     Types: 1 Cans of beer per week     Comment: RARE    Drug use: No    Sexual activity: Not on file   Lifestyle    Physical activity     Days per week: Not on file     Minutes per session: Not on file    Stress: Not on file   Relationships    Social connections     Talks on phone: Not on file     Gets together: Not on file     Attends Protestant service: Not on file     Active member of club or organization: Not on file     Attends meetings of clubs or organizations: Not on file     Relationship status: Not on file    Intimate partner violence     Fear of current or ex partner: about age related risk factors and have thoroughly discussed the importance of taking these medications as prescribed. The patient verbalizes understanding. Patient advised regarding steps to help prevent the spread of COVID-19   SOURCE - https://jarad-luis fernando.info/. html     1-Stay home except to get medical care  2-Clean your hands often for atleast 20 seconds, avoid touching: Avoid touching your eyes, nose, and mouth with unwashed hands. 3-Seek medical attention: Seek prompt medical attention if your illness is worsening (e.g., difficulty breathing). Call you doctor first.  3-Wear a facemask if you are sick   4-Cover your coughs and sneezes           I affirm this is a Patient Initiated Episode with an established Patient who has not had a related appointment within my department in the past 7 days or scheduled within the next 24 hours.     Total Time: 16 minutes      Cc: Referring physician

## 2020-12-18 ENCOUNTER — VIRTUAL VISIT (OUTPATIENT)
Dept: PAIN MANAGEMENT | Age: 80
End: 2020-12-18
Payer: MEDICARE

## 2020-12-18 PROCEDURE — 99213 OFFICE O/P EST LOW 20 MIN: CPT | Performed by: PAIN MEDICINE

## 2020-12-18 NOTE — PROGRESS NOTES
Nolvia Liang was read the following message We want to confirm that, for purposes of billing, this is a virtual visit with your provider for which we will submit a claim for reimbursement with your insurance company. You will be responsible for any copays, coinsurance amounts or other amounts not covered by your insurance company. If you do not accept this, unfortunately we will not be able to schedule a virtual visit with the provider. Do you accept?  Sean Marroquin responded Vena Pickup

## 2022-07-28 ENCOUNTER — OFFICE VISIT (OUTPATIENT)
Dept: NEUROSURGERY | Age: 82
End: 2022-07-28
Payer: MEDICARE

## 2022-07-28 VITALS
DIASTOLIC BLOOD PRESSURE: 62 MMHG | RESPIRATION RATE: 20 BRPM | WEIGHT: 172 LBS | SYSTOLIC BLOOD PRESSURE: 114 MMHG | BODY MASS INDEX: 32.47 KG/M2 | OXYGEN SATURATION: 96 % | HEART RATE: 75 BPM | TEMPERATURE: 97.9 F | HEIGHT: 61 IN

## 2022-07-28 DIAGNOSIS — G89.29 CHRONIC BILATERAL LOW BACK PAIN WITHOUT SCIATICA: Primary | ICD-10-CM

## 2022-07-28 DIAGNOSIS — M54.50 CHRONIC BILATERAL LOW BACK PAIN WITHOUT SCIATICA: Primary | ICD-10-CM

## 2022-07-28 PROCEDURE — 1123F ACP DISCUSS/DSCN MKR DOCD: CPT | Performed by: NEUROLOGICAL SURGERY

## 2022-07-28 PROCEDURE — 99212 OFFICE O/P EST SF 10 MIN: CPT

## 2022-07-28 PROCEDURE — 99212 OFFICE O/P EST SF 10 MIN: CPT | Performed by: NEUROLOGICAL SURGERY

## 2022-07-28 RX ORDER — OMEPRAZOLE 40 MG/1
CAPSULE, DELAYED RELEASE ORAL
COMMUNITY
Start: 2022-06-03

## 2022-07-28 NOTE — PROGRESS NOTES
Patient is here for follow up consult for: SCS programming. Physical exam  Alert and Oriented X3  PERRLA, EOMI  OLIVERA 5/5  Sensation intact to LT and PP  Reflexes are 2+ and symmetric    A/P: patient is here for follow up for: back pain. Shobha from [address] helped with programming her stimulator. Her x-rays look good.   Follow up tasha Colorado MD

## 2022-08-08 ENCOUNTER — TELEPHONE (OUTPATIENT)
Dept: NEUROSURGERY | Age: 82
End: 2022-08-08

## 2022-08-08 NOTE — TELEPHONE ENCOUNTER
Pt was seen by Dr Marquez Rockwell on 7/28/22 along with [facility]. Pt states Dr Marquez Rockwell informed her that if the stimulator was still not working that he would refer her to another surgeon. Pt did not know who that  was and there is no mention in Dr Randal Bah note. Will speak with Dr Marquez Rockwell regarding this tomorrow when he is in the office. Pt acknowledged. Pt can be reached at [phone].

## 2022-08-09 DIAGNOSIS — B02.29 POSTHERPETIC NEURALGIA: Primary | ICD-10-CM

## 2022-10-04 NOTE — TELEPHONE ENCOUNTER
Dr. Caridad Mendieta at [facility] clinic for intercostal nerve section    Referral faxed on 8/12/2022

## 2022-10-11 ENCOUNTER — TELEPHONE (OUTPATIENT)
Dept: NEUROSURGERY | Age: 82
End: 2022-10-11

## 2022-10-11 NOTE — TELEPHONE ENCOUNTER
Spoke with Pts daughter, informed her Dr Alena Contreras office is expediting her referral and will call the patient to schedule. Daughter given [facility] # [phone].

## 2022-10-27 DIAGNOSIS — G58.8 INTERCOSTAL NEURALGIA: Primary | ICD-10-CM

## 2023-07-21 ENCOUNTER — OFFICE VISIT (OUTPATIENT)
Dept: PAIN MANAGEMENT | Age: 83
End: 2023-07-21
Payer: MEDICARE

## 2023-07-21 VITALS
HEIGHT: 61 IN | HEART RATE: 71 BPM | BODY MASS INDEX: 31.15 KG/M2 | RESPIRATION RATE: 18 BRPM | TEMPERATURE: 97.3 F | DIASTOLIC BLOOD PRESSURE: 63 MMHG | OXYGEN SATURATION: 98 % | SYSTOLIC BLOOD PRESSURE: 96 MMHG | WEIGHT: 165 LBS

## 2023-07-21 DIAGNOSIS — G89.4 CHRONIC PAIN SYNDROME: Primary | ICD-10-CM

## 2023-07-21 DIAGNOSIS — M79.2 NEUROPATHIC PAIN: ICD-10-CM

## 2023-07-21 DIAGNOSIS — B02.29 POSTHERPETIC NEURALGIA: ICD-10-CM

## 2023-07-21 PROCEDURE — 1123F ACP DISCUSS/DSCN MKR DOCD: CPT | Performed by: PAIN MEDICINE

## 2023-07-21 PROCEDURE — G8400 PT W/DXA NO RESULTS DOC: HCPCS | Performed by: PAIN MEDICINE

## 2023-07-21 PROCEDURE — 3074F SYST BP LT 130 MM HG: CPT | Performed by: PAIN MEDICINE

## 2023-07-21 PROCEDURE — 1036F TOBACCO NON-USER: CPT | Performed by: PAIN MEDICINE

## 2023-07-21 PROCEDURE — 99214 OFFICE O/P EST MOD 30 MIN: CPT | Performed by: PAIN MEDICINE

## 2023-07-21 PROCEDURE — 99215 OFFICE O/P EST HI 40 MIN: CPT | Performed by: PAIN MEDICINE

## 2023-07-21 PROCEDURE — G8417 CALC BMI ABV UP PARAM F/U: HCPCS | Performed by: PAIN MEDICINE

## 2023-07-21 PROCEDURE — 3078F DIAST BP <80 MM HG: CPT | Performed by: PAIN MEDICINE

## 2023-07-21 PROCEDURE — 1090F PRES/ABSN URINE INCON ASSESS: CPT | Performed by: PAIN MEDICINE

## 2023-07-21 PROCEDURE — G8427 DOCREV CUR MEDS BY ELIG CLIN: HCPCS | Performed by: PAIN MEDICINE

## 2023-07-21 RX ORDER — OXYCODONE HYDROCHLORIDE AND ACETAMINOPHEN 5; 325 MG/1; MG/1
1 TABLET ORAL DAILY PRN
Qty: 30 TABLET | Refills: 0 | Status: SHIPPED | OUTPATIENT
Start: 2023-07-21 | End: 2023-08-20

## 2023-07-21 NOTE — PROGRESS NOTES
Valarie Gutierrez presents to the [facility] on 7/21/2023. Harsh Faulkner is complaining of pain under left arm. The pain is constant. The pain is described as throbbing, stabbing, nagging, and miserable. Pain is rated on her best day at a 2, on her worst day at a 10, and on average at a 8 on the VAS scale. She took her last dose of Tylenol a few days ago. Any procedures since your last visit: No    Pacemaker or defibrillator: No  She is not on NSAIDS and is not on anticoagulation medications . Medication Contract and Consent for Opioid Use Documents Filed       Patient Documents       Type of Document Status Date Received Received By Description    Medication Contract Received 8/15/2018  1:26 PM Talon BARAHONA [facility]IN [facility]NAGEMENT PATIENT AGREEMENT 8/15/2018    Medication Contract Received 1/26/2019  9:43 AM [facility] medication contract    Medication Contract Re[facility] 10/21/2020  2:48 [facility]                    There were no vitals taken for this visit. No LMP recorded. Patient has had a hysterectomy.

## 2023-07-21 NOTE — PROGRESS NOTES
[facility] - [facility] Pain Management  [address] [address] [facility] - [facility], [address] [facility]    Follow up Note      La Nena Strong     Date of Visit:  2023    CC:  Patient presents for follow up   Chief Complaint   Patient presents with    Pain     Shingles pain     HPI:    Pain is unchanged. Change in quality of symptoms:no. Medication side effects:none. Recent diagnostic testing:none. Recent interventional procedures:none. She has been on anticoagulation medications to include ASA 81 mg and has not been on herbal supplements. She is not diabetic. Imaging:   Thoracic MRI 2018 -   FINDINGS: Multiplanar, multisequence MR images of the thoracic spine. Images degraded by artifact. Thoracic vertebral height and alignment intact. No acute osseous   signal and mallet. Thoracic spinal cord exhibits unremarkable caliber   and signal. Within the exam limits, no disc herniation or central   spinal canal impingement. Lumbar xray 2018 -   1. Osteoporosis and rotoscoliosis. 2. Multilevel degenerative disc disease. 3. Disc narrowing at L3-4 is seen. Thoracic xray 2018 -   1. Osteoporosis and rotoscoliosis. 2. Multilevel disc narrowing with only minimal spurring. 3. No fracture, subluxation or destructive lesion.       Previous treatments: Acupuncture, Nerve block (intercostal), Epidural Steroid Injection x5 and medications ((gabapentin, nortriptyline, lidocaine patches and cream, prednisone, capsaicin cream, tramadol, duloxetine, cyclobenzaprine, Lyrica 50 mg and 100 mg, aspercream/lidocaine, cymbalta, fentanyl patches 12 mcg/hr and 25 mcg/hr, oxycodone 5 mg and 10 mg, carbamazepine (caused staggering and double vision), terrasil cream)) - the medications sometimes work for a short time then stop working     Potential Aberrant Drug-Related Behavior: no      Urine Drug Screenin2018 consistent     OARRS report:  10/2018 consistent  2018 consistent  10/2020 consistent       Past Medical History:

## 2023-07-24 LAB
6-MAM, QUANTITATIVE, URINE: <10 NG/ML
6-MONOACETYLMORPHINE, URINE: NEGATIVE
7-AMINOCLONAZEPAM, QUANTITATIVE, URINE: <50 NG/ML
ABNORMAL SPECIMEN VALIDITY TEST: NORMAL
ALCOHOL URINE: NOT DETECTED MG/DL
ALPHA-HYDROXYALPRAZOLAM, QUANTITATIVE, URINE: <50 NG/ML
ALPHA-HYDROXYMIDAZOLAM, QUANTITATIVE, URINE: <50 NG/ML
ALPHA-HYDROXYTRIAZOLAM, QUANTITATIVE, URINE: <50 NG/ML
ALPRAZOLAM URINE QUANT: <50 NG/ML
AMPHETAMINE SCREEN URINE: NEGATIVE
BARBITURATE SCREEN URINE: NEGATIVE
BENZODIAZEPINE SCREEN, URINE: NEGATIVE
BUPRENORPHINE URINE: NEGATIVE
CANNABINOID SCREEN URINE: NEGATIVE
CHLORDIAZEPOXIDE, QUANTITATIVE, URINE: <50 NG/ML
CLONAZEPAM, QUANTITATIVE, URINE: <50 NG/ML
COCAINE METABOLITE, URINE: NEGATIVE
CODEINE, QUANTITATIVE, URINE: <50 NG/ML
COMPLIANCE DRUG ANALYSIS, URINE: NORMAL
DIAZEPAM URINE QUANT: <50 NG/ML
FENTANYL URINE: NEGATIVE
FLUNITRAZEPAM, QUANTITATIVE, URINE: <50 NG/ML
FLURAZEPAM, QUANTITATIVE, URINE: <50 NG/ML
HYDROCODONE, QUANTITATIVE, URINE: <50 NG/ML
HYDROMORPHONE, QUANTITATIVE, URINE: <50 NG/ML
INTEGRITY CHECK, CREATININE, URINE: 43.3
INTEGRITY CHECK, OXIDANT, URINE: 22
INTEGRITY CHECK, PH, URINE: 6.2
INTEGRITY CHECK, SPECIFIC GRAVITY, URINE: 1.01
LORAZEPAM URINE QUANT: <50 NG/ML
METHADONE SCREEN, URINE: NEGATIVE
MIDAZOLAM URINE QUANT: <50 NG/ML
MORPHINE, QUANTITATIVE, URINE: <50 NG/ML
NORDIAZEPAM URINE QUANT: <50 NG/ML
NORHYDROCODONE, QUANTITATIVE, URINE: <50 NG/ML
NOROXYCODONE, QUANTITATIVE, URINE: <50 NG/ML
OPIATES, URINE: NEGATIVE
OXAZEPAM URINE QUANT: <50 NG/ML
OXYCODONE SCREEN URINE: NEGATIVE
OXYCODONE URINE, QUANTITATIVE: <50 NG/ML
OXYMORPHONE, QUANTITATIVE, URINE: <50 NG/ML
PHENCYCLIDINE, URINE: NEGATIVE
TEMAZEPAM, QUANTITATIVE, URINE: <50 NG/ML
TEST INFORMATION: NORMAL
TRAMADOL, URINE: NEGATIVE

## 2023-08-23 ENCOUNTER — PREP FOR PROCEDURE (OUTPATIENT)
Dept: PAIN MANAGEMENT | Age: 83
End: 2023-08-23

## 2023-08-23 ENCOUNTER — OFFICE VISIT (OUTPATIENT)
Dept: PAIN MANAGEMENT | Age: 83
End: 2023-08-23
Payer: MEDICARE

## 2023-08-23 VITALS
SYSTOLIC BLOOD PRESSURE: 108 MMHG | OXYGEN SATURATION: 98 % | RESPIRATION RATE: 16 BRPM | HEART RATE: 70 BPM | DIASTOLIC BLOOD PRESSURE: 70 MMHG | TEMPERATURE: 97.2 F

## 2023-08-23 DIAGNOSIS — M79.2 NEUROPATHIC PAIN: ICD-10-CM

## 2023-08-23 DIAGNOSIS — B02.29 POSTHERPETIC NEURALGIA: ICD-10-CM

## 2023-08-23 DIAGNOSIS — G58.8 INTERCOSTAL NEURALGIA: ICD-10-CM

## 2023-08-23 DIAGNOSIS — G58.8 INTERCOSTAL NEURALGIA: Primary | ICD-10-CM

## 2023-08-23 DIAGNOSIS — G89.4 CHRONIC PAIN SYNDROME: Primary | ICD-10-CM

## 2023-08-23 PROCEDURE — G8427 DOCREV CUR MEDS BY ELIG CLIN: HCPCS | Performed by: PAIN MEDICINE

## 2023-08-23 PROCEDURE — 99213 OFFICE O/P EST LOW 20 MIN: CPT

## 2023-08-23 PROCEDURE — G8417 CALC BMI ABV UP PARAM F/U: HCPCS | Performed by: PAIN MEDICINE

## 2023-08-23 PROCEDURE — 3074F SYST BP LT 130 MM HG: CPT | Performed by: PAIN MEDICINE

## 2023-08-23 PROCEDURE — 1090F PRES/ABSN URINE INCON ASSESS: CPT | Performed by: PAIN MEDICINE

## 2023-08-23 PROCEDURE — G8400 PT W/DXA NO RESULTS DOC: HCPCS | Performed by: PAIN MEDICINE

## 2023-08-23 PROCEDURE — 99215 OFFICE O/P EST HI 40 MIN: CPT | Performed by: PAIN MEDICINE

## 2023-08-23 PROCEDURE — 1123F ACP DISCUSS/DSCN MKR DOCD: CPT | Performed by: PAIN MEDICINE

## 2023-08-23 PROCEDURE — 1036F TOBACCO NON-USER: CPT | Performed by: PAIN MEDICINE

## 2023-08-23 PROCEDURE — 3078F DIAST BP <80 MM HG: CPT | Performed by: PAIN MEDICINE

## 2023-08-23 RX ORDER — OXYCODONE HYDROCHLORIDE AND ACETAMINOPHEN 5; 325 MG/1; MG/1
1 TABLET ORAL DAILY PRN
Qty: 30 TABLET | Refills: 0 | Status: SHIPPED | OUTPATIENT
Start: 2023-08-23 | End: 2023-09-22

## 2023-08-23 RX ORDER — METOCLOPRAMIDE 5 MG/1
TABLET ORAL
COMMUNITY
Start: 2023-07-18

## 2023-08-28 ENCOUNTER — TELEPHONE (OUTPATIENT)
Dept: PAIN MANAGEMENT | Age: 83
End: 2023-08-28

## 2023-08-28 NOTE — TELEPHONE ENCOUNTER
Call to Refugio Randall that procedure was approved for 9/5/2023 and that Mars Restrepo should call her a few days before for the pre op call and between 2:00 PM and 4:00 PM  the business day before with the arrival time. Instructed Sultana to hold ibuprofen for 24 hours, naprosyn for 4 days and any aspirin containing products or fish oil for 7 days. Instructed to call office back if any questions. Jorgito Matute verbalized understanding.     Electronically signed by Nabil Edmonds RN on 8/28/2023 at 2:47 PM

## 2023-08-30 NOTE — PROGRESS NOTES
[address] PAIN MANAGEMENT  INSTRUCTIONS  . .......................................................................................................................................... [x] Parking the day of Surgery is located in the [facility].   Upon entering the door, make immediate right into the surgery reception room    [x]  Bring photo ID and insurance card     [x] You may have a light breakfast day of procedure    [x]  Wear loose comfortable clothing    [x]  Please follow instructions for medications as given per Dr's office    [x] You can expect a call the business day prior to procedure to notify you of your arrival time     [x] Please arrange for     []  Other instructions

## 2023-09-05 ENCOUNTER — HOSPITAL ENCOUNTER (OUTPATIENT)
Age: 83
Setting detail: OUTPATIENT SURGERY
Discharge: HOME | End: 2023-09-05
Attending: PAIN MEDICINE | Admitting: PAIN MEDICINE
Payer: MEDICARE

## 2023-09-05 ENCOUNTER — HOSPITAL ENCOUNTER (OUTPATIENT)
Dept: GENERAL RADIOLOGY | Age: 83
Setting detail: OUTPATIENT SURGERY
Discharge: HOME | End: 2023-09-07
Attending: PAIN MEDICINE
Payer: MEDICARE

## 2023-09-05 VITALS
BODY MASS INDEX: 30.96 KG/M2 | SYSTOLIC BLOOD PRESSURE: 130 MMHG | DIASTOLIC BLOOD PRESSURE: 78 MMHG | RESPIRATION RATE: 18 BRPM | WEIGHT: 164 LBS | HEIGHT: 61 IN | OXYGEN SATURATION: 99 % | HEART RATE: 64 BPM

## 2023-09-05 DIAGNOSIS — R52 PAIN MANAGEMENT: ICD-10-CM

## 2023-09-05 PROCEDURE — 7100000010 HC PHASE II RECOVERY - FIRST 15 MIN: Performed by: PAIN MEDICINE

## 2023-09-05 PROCEDURE — 2709999900 HC NON-CHARGEABLE SUPPLY: Performed by: PAIN MEDICINE

## 2023-09-05 PROCEDURE — 64421 NJX AA&/STRD NTRCOST NRV EA: CPT | Performed by: PAIN MEDICINE

## 2023-09-05 PROCEDURE — 3600000002 HC SURGERY LEVEL 2 BASE: Performed by: PAIN MEDICINE

## 2023-09-05 PROCEDURE — 2500000003 HC RX 250 WO HCPCS: Performed by: PAIN MEDICINE

## 2023-09-05 PROCEDURE — 6360000002 HC RX W HCPCS: Performed by: PAIN MEDICINE

## 2023-09-05 PROCEDURE — 64420 NJX AA&/STRD NTRCOST NRV 1: CPT | Performed by: PAIN MEDICINE

## 2023-09-05 PROCEDURE — 7100000011 HC PHASE II RECOVERY - ADDTL 15 MIN: Performed by: PAIN MEDICINE

## 2023-09-05 PROCEDURE — 6360000004 HC RX CONTRAST MEDICATION: Performed by: PAIN MEDICINE

## 2023-09-05 RX ORDER — BUPIVACAINE HYDROCHLORIDE 2.5 MG/ML
INJECTION, SOLUTION EPIDURAL; INFILTRATION; INTRACAUDAL PRN
Status: DISCONTINUED | OUTPATIENT
Start: 2023-09-05 | End: 2023-09-05 | Stop reason: ALTCHOICE

## 2023-09-05 RX ORDER — LIDOCAINE HYDROCHLORIDE 5 MG/ML
INJECTION, SOLUTION INFILTRATION; INTRAVENOUS PRN
Status: DISCONTINUED | OUTPATIENT
Start: 2023-09-05 | End: 2023-09-05 | Stop reason: ALTCHOICE

## 2023-09-05 RX ORDER — IOPAMIDOL 612 MG/ML
INJECTION, SOLUTION INTRATHECAL PRN
Status: DISCONTINUED | OUTPATIENT
Start: 2023-09-05 | End: 2023-09-05 | Stop reason: ALTCHOICE

## 2023-09-05 RX ORDER — METHYLPREDNISOLONE ACETATE 40 MG/ML
INJECTION, SUSPENSION INTRA-ARTICULAR; INTRALESIONAL; INTRAMUSCULAR; SOFT TISSUE PRN
Status: DISCONTINUED | OUTPATIENT
Start: 2023-09-05 | End: 2023-09-05 | Stop reason: ALTCHOICE

## 2023-09-05 ASSESSMENT — PAIN SCALES - GENERAL
PAINLEVEL_OUTOF10: 0

## 2023-09-05 ASSESSMENT — PAIN DESCRIPTION - DESCRIPTORS: DESCRIPTORS: DISCOMFORT

## 2023-09-05 ASSESSMENT — PAIN - FUNCTIONAL ASSESSMENT: PAIN_FUNCTIONAL_ASSESSMENT: 0-10

## 2023-09-05 NOTE — DISCHARGE INSTRUCTIONS
Dom [facility] Block/Radiofrequency  Home Going Instructions    1-Go home, rest for the remainder of the day  2-Please do not lift over 20 pounds the day of the injection  3-If you received sedation No: alcohol, driving, operating lawn mowers, plows, tractors or other dangerous equipment until next morning. Do not make important decisions or sign legal documents for 24 hours. You may experience light headedness, dizziness, nausea or sleepiness after sedation. Do not stay alone. A responsible adult must be with you for 24 hours. You could be nauseated from the medications you have received. Your IV site may be sore and bruised. 4-No dietary restrictions     5-Resume all medications the same day, blood thinners to be resumed 24 hours after injection if you were instructed to stop any. 6-Keep the surgical site clean and dry, you may shower the next morning and remove the      dressing. 7- No sitz baths, tub baths or hot tubs/swimming for 24 hours. 8- If you have any pain at the injection site(s), application of an ice pack to the area should be       helpful, 20 minutes on/20 minutes off for next 48 hours. 9- Call [facility] immediately at if you develop.   Fever greater than 100.4 F  Have bleeding or drainage from the puncture site  Have progressive Leg/arm numbness and or weakness  Loss of control of bowel and or bladder (wet/soil yourself)  Severe headache with inability to lift head  10-You may return to work the next day

## 2023-09-05 NOTE — H&P
mouth as needed    Historical Provider, MD   hydrochlorothiazide (MICROZIDE) 12.5 MG capsule Take 1 capsule by mouth daily PCP INSTRUCTED HER TO STOP    Historical Provider, MD   lisinopril (PRINIVIL;ZESTRIL) 2.5 MG tablet Take 1 tablet by mouth daily    Historical Provider, MD   pravastatin (PRAVACHOL) 40 MG tablet Take 1 tablet by mouth daily    Historical Provider, MD       No Known Allergies    Social History     Socioeconomic History    Marital status:      Spouse name: Not on file    Number of children: Not on file    Years of education: Not on file    Highest education level: Not on file   Occupational History    Not on file   Tobacco Use    Smoking status: Former    Smokeless tobacco: Never   Vaping Use    Vaping Use: Never used   Substance and Sexual Activity    Alcohol use: Yes     Alcohol/week: 1.0 standard drink     Types: 1 Cans of beer per week     Comment: RARE    Drug use: No    Sexual activity: Not on file   Other Topics Concern    Not on file   Social History Narrative    Not on file     Social Determinants of Health     Financial Resource Strain: Not on file   Food Insecurity: Not on file   Transportation Needs: Not on file   Physical Activity: Not on file   Stress: Not on file   Social Connections: Not on file   Intimate Partner Violence: Not on file   Housing Stability: Not on file       Family History   Problem Relation Age of Onset    Cancer Other     Diabetes Other     Heart Disease Other          REVIEW OF SYSTEMS:    CONSTITUTIONAL:  negative for  fevers, chills, sweats and fatigue    RESPIRATORY:  negative for  dry cough, cough with sputum, dyspnea, wheezing and chest pain    CARDIOVASCULAR:  negative for chest pain, dyspnea, palpitations, syncope    GASTROINTESTINAL:  negative for nausea, vomiting, change in bowel habits, diarrhea, constipation and abdominal pain    MUSCULOSKELETAL: negative for muscle weakness    SKIN: negative for itching or rashes.     BEHAVIOR/PSYCH:  negative

## 2023-09-05 NOTE — OP NOTE
2023    Patient: Curtis Dill  :    Age:  81 y.o. Sex:  female     PRE-OPERATIVE DIAGNOSIS: Intercostal nerve neuralgia. POST-OPERATIVE DIAGNOSIS: Same. PROCEDURE:  Fluoroscopic guided intercostal nerve block at the T9,10,11 level Right     SURGEON: BALDEMAR Busby. ANESTHESIA: local    ESTIMATED BLOOD LOSS: None.  ______________________________________________________________________    BRIEF HISTORY:  Curtis Dill comes in today for fluoroscopic guided intercostal nerve block at T9,10,11 level  Right. The potential complications of this procedure were discussed with her again today. She has elected to undergo the aforementioned procedure. Kevyn complete History & Physical examination were reviewed in depth, a copy of which is in the chart. DESCRIPTION OF PROCEDURE:   After confirming written and informed consent, a time-out was performed and Kevyn name and date of birth, the procedure to be performed as well as the plan for the location of the needle insertion were confirmed. The patient was brought into the procedure room and placed in the prone position on the fluoroscopy table. Standard monitors were placed, and vital signs were observed throughout the procedure. The area of the ribs at the above mentioned level(s) were prepped with chloraprep and draped in usual sterile manner. The appropriate ribs were marked under fluoroscopic guidance. The skin and subcutaneous tissues at the above mentioned level(s) were anesthestized with 0.5% lidocaine. A 25# gauge 1 1/2 inch needle was advanced toward the inferior aspect of the above mentioned rib level(s). It was then walked of the inferior edge of the rib and advanced an additional 2-3 mm into the space containing the neurovascular bundle. 1 cc of Omnipaque 240 was injected under live fluoroscopy to confirm appropriate spread without evidence of intravascular or intrathecal uptake.  After negative aspiration, a solution of 025% marcaine 2 cc and 40 mg DepMedrol was injected, in equal divided dosages between each side,and the needles were removed. Disposition the patient tolerated the procedure well and there were no complications . Vital signs remained stable throughout the procedure. The patient was escorted to the recovery area where they remained until discharge and written discharge instructions for the procedure were given. Plan: Navneet Do will return to our pain management center as scheduled.      Nida Abel DO

## 2023-09-21 NOTE — PROGRESS NOTES
[facility] - BEHAVIORAL HEALTH SERVICES Pain Management  [address] - BEHAVIORAL HEALTH SERVICES, [address] [facility]    Follow up Note      Erasmo Salvador     Date of Visit:  9/22/2023    CC:  Patient presents for follow up   Chief Complaint   Patient presents with    Follow-up     Intercostal nerve block 9/5/23 RT T9,T10,T11. Follow up. Patient states she had no relief from procedure. HPI:    Pain is unchanged. Change in quality of symptoms:no. Medication side effects:none. Recent diagnostic testing:none. Recent interventional procedures:right T9,10,11 intercostal nerve block without relief. She has not been on anticoagulation medications and has not been on herbal supplements. She is not diabetic. Imaging:   Thoracic MRI 9/2018 -   FINDINGS: Multiplanar, multisequence MR images of the thoracic spine. Images degraded by artifact. Thoracic vertebral height and alignment intact. No acute osseous   signal and mallet. Thoracic spinal cord exhibits unremarkable caliber   and signal. Within the exam limits, no disc herniation or central   spinal canal impingement. Lumbar xray 2018 -   1. Osteoporosis and rotoscoliosis. 2. Multilevel degenerative disc disease. 3. Disc narrowing at L3-4 is seen. Thoracic xray 2018 -   1. Osteoporosis and rotoscoliosis. 2. Multilevel disc narrowing with only minimal spurring. 3. No fracture, subluxation or destructive lesion.       Previous treatments: Acupuncture, Nerve block (intercostal), Epidural Steroid Injection x5 and medications ((gabapentin, nortriptyline, lidocaine patches and cream, prednisone, capsaicin cream, tramadol, duloxetine, cyclobenzaprine, Lyrica 50 mg and 100 mg, aspercream/lidocaine, cymbalta, fentanyl patches 12 mcg/hr and 25 mcg/hr, oxycodone 5 mg and 10 mg, carbamazepine (caused staggering and double vision), terrasil cream)) - the medications sometimes work for a short time then stop working     Potential Aberrant Drug-Related Behavior: no      Urine Drug

## 2023-09-22 ENCOUNTER — OFFICE VISIT (OUTPATIENT)
Dept: PAIN MANAGEMENT | Age: 83
End: 2023-09-22
Payer: MEDICARE

## 2023-09-22 VITALS
WEIGHT: 162 LBS | BODY MASS INDEX: 30.58 KG/M2 | SYSTOLIC BLOOD PRESSURE: 122 MMHG | TEMPERATURE: 97.4 F | DIASTOLIC BLOOD PRESSURE: 77 MMHG | HEART RATE: 65 BPM | HEIGHT: 61 IN | OXYGEN SATURATION: 98 %

## 2023-09-22 DIAGNOSIS — M79.2 NEUROPATHIC PAIN: ICD-10-CM

## 2023-09-22 DIAGNOSIS — B02.29 POSTHERPETIC NEURALGIA: ICD-10-CM

## 2023-09-22 DIAGNOSIS — G89.4 CHRONIC PAIN SYNDROME: Primary | ICD-10-CM

## 2023-09-22 PROCEDURE — 3074F SYST BP LT 130 MM HG: CPT | Performed by: PAIN MEDICINE

## 2023-09-22 PROCEDURE — G8427 DOCREV CUR MEDS BY ELIG CLIN: HCPCS | Performed by: PAIN MEDICINE

## 2023-09-22 PROCEDURE — 1123F ACP DISCUSS/DSCN MKR DOCD: CPT | Performed by: PAIN MEDICINE

## 2023-09-22 PROCEDURE — 1090F PRES/ABSN URINE INCON ASSESS: CPT | Performed by: PAIN MEDICINE

## 2023-09-22 PROCEDURE — G8400 PT W/DXA NO RESULTS DOC: HCPCS | Performed by: PAIN MEDICINE

## 2023-09-22 PROCEDURE — 1036F TOBACCO NON-USER: CPT | Performed by: PAIN MEDICINE

## 2023-09-22 PROCEDURE — 3078F DIAST BP <80 MM HG: CPT | Performed by: PAIN MEDICINE

## 2023-09-22 PROCEDURE — 99213 OFFICE O/P EST LOW 20 MIN: CPT | Performed by: PAIN MEDICINE

## 2023-09-22 PROCEDURE — G8417 CALC BMI ABV UP PARAM F/U: HCPCS | Performed by: PAIN MEDICINE

## 2023-09-22 RX ORDER — OXYCODONE HYDROCHLORIDE AND ACETAMINOPHEN 5; 325 MG/1; MG/1
1 TABLET ORAL DAILY PRN
Qty: 30 TABLET | Refills: 0 | Status: SHIPPED | OUTPATIENT
Start: 2023-09-22 | End: 2023-10-22

## 2023-09-22 RX ORDER — LIDOCAINE 50 MG/G
OINTMENT TOPICAL
Qty: 240 G | Refills: 5 | Status: SHIPPED | OUTPATIENT
Start: 2023-09-22 | End: 2024-03-20

## 2023-10-19 ENCOUNTER — OFFICE VISIT (OUTPATIENT)
Dept: PAIN MANAGEMENT | Age: 83
End: 2023-10-19
Payer: MEDICARE

## 2023-10-19 VITALS
RESPIRATION RATE: 16 BRPM | BODY MASS INDEX: 30.58 KG/M2 | HEIGHT: 61 IN | SYSTOLIC BLOOD PRESSURE: 112 MMHG | OXYGEN SATURATION: 96 % | DIASTOLIC BLOOD PRESSURE: 79 MMHG | WEIGHT: 162 LBS | HEART RATE: 93 BPM

## 2023-10-19 DIAGNOSIS — G89.4 CHRONIC PAIN SYNDROME: Primary | ICD-10-CM

## 2023-10-19 DIAGNOSIS — G89.4 CHRONIC PAIN SYNDROME: ICD-10-CM

## 2023-10-19 DIAGNOSIS — M79.2 NEUROPATHIC PAIN: ICD-10-CM

## 2023-10-19 DIAGNOSIS — B02.29 POSTHERPETIC NEURALGIA: ICD-10-CM

## 2023-10-19 PROCEDURE — 1036F TOBACCO NON-USER: CPT | Performed by: PAIN MEDICINE

## 2023-10-19 PROCEDURE — 99213 OFFICE O/P EST LOW 20 MIN: CPT | Performed by: PAIN MEDICINE

## 2023-10-19 PROCEDURE — 3074F SYST BP LT 130 MM HG: CPT | Performed by: PAIN MEDICINE

## 2023-10-19 PROCEDURE — G8427 DOCREV CUR MEDS BY ELIG CLIN: HCPCS | Performed by: PAIN MEDICINE

## 2023-10-19 PROCEDURE — 3078F DIAST BP <80 MM HG: CPT | Performed by: PAIN MEDICINE

## 2023-10-19 PROCEDURE — G8400 PT W/DXA NO RESULTS DOC: HCPCS | Performed by: PAIN MEDICINE

## 2023-10-19 PROCEDURE — 1123F ACP DISCUSS/DSCN MKR DOCD: CPT | Performed by: PAIN MEDICINE

## 2023-10-19 PROCEDURE — G8417 CALC BMI ABV UP PARAM F/U: HCPCS | Performed by: PAIN MEDICINE

## 2023-10-19 PROCEDURE — 1090F PRES/ABSN URINE INCON ASSESS: CPT | Performed by: PAIN MEDICINE

## 2023-10-19 PROCEDURE — G8484 FLU IMMUNIZE NO ADMIN: HCPCS | Performed by: PAIN MEDICINE

## 2023-10-19 RX ORDER — OXYCODONE HYDROCHLORIDE AND ACETAMINOPHEN 5; 325 MG/1; MG/1
1 TABLET ORAL DAILY PRN
Qty: 30 TABLET | Refills: 0 | Status: SHIPPED | OUTPATIENT
Start: 2023-10-22 | End: 2023-11-21

## 2023-10-19 NOTE — PROGRESS NOTES
[facility]  [address], [address] [facility]    Follow up Note      Marion Mendez     Date of Visit:  10/19/2023    CC:  Patient presents for follow up   Chief Complaint   Patient presents with    Pain     HPI:    Pain is unchanged. Change in quality of symptoms:no. Medication side effects:none. Recent diagnostic testing:none. Recent interventional procedures:none. She has not been on anticoagulation medications and has not been on herbal supplements. She is not diabetic. Imaging:   Thoracic MRI 2018 -   FINDINGS: Multiplanar, multisequence MR images of the thoracic spine. Images degraded by artifact. Thoracic vertebral height and alignment intact. No acute osseous   signal and mallet. Thoracic spinal cord exhibits unremarkable caliber   and signal. Within the exam limits, no disc herniation or central   spinal canal impingement. Lumbar xray 2018 -   1. Osteoporosis and rotoscoliosis. 2. Multilevel degenerative disc disease. 3. Disc narrowing at L3-4 is seen. Thoracic xray 2018 -   1. Osteoporosis and rotoscoliosis. 2. Multilevel disc narrowing with only minimal spurring. 3. No fracture, subluxation or destructive lesion.       Previous treatments: Acupuncture, Nerve block (intercostal), Epidural Steroid Injection x5 and medications ((gabapentin, nortriptyline, lidocaine patches and cream, prednisone, capsaicin cream, tramadol, duloxetine, cyclobenzaprine, Lyrica 50 mg and 100 mg, aspercream/lidocaine, cymbalta, fentanyl patches 12 mcg/hr and 25 mcg/hr, oxycodone 5 mg and 10 mg, carbamazepine (caused staggering and double vision), terrasil cream)) - the medications sometimes work for a short time then stop working     Potential Aberrant Drug-Related Behavior: no      Urine Drug Screenin2018 consistent  2023 consistent     OARRS report:  10/2018 consistent  2018 consistent  10/2020 consistent  2023-10/2023 consistent       Past Medical

## 2023-11-16 ENCOUNTER — OFFICE VISIT (OUTPATIENT)
Dept: PAIN MANAGEMENT | Age: 83
End: 2023-11-16
Payer: MEDICARE

## 2023-11-16 VITALS
RESPIRATION RATE: 16 BRPM | HEART RATE: 95 BPM | DIASTOLIC BLOOD PRESSURE: 79 MMHG | BODY MASS INDEX: 30.58 KG/M2 | SYSTOLIC BLOOD PRESSURE: 133 MMHG | HEIGHT: 61 IN | WEIGHT: 162 LBS | OXYGEN SATURATION: 95 % | TEMPERATURE: 98.1 F

## 2023-11-16 DIAGNOSIS — M79.2 NEUROPATHIC PAIN: ICD-10-CM

## 2023-11-16 DIAGNOSIS — B02.29 POSTHERPETIC NEURALGIA: ICD-10-CM

## 2023-11-16 DIAGNOSIS — G89.4 CHRONIC PAIN SYNDROME: ICD-10-CM

## 2023-11-16 DIAGNOSIS — G89.4 CHRONIC PAIN SYNDROME: Primary | ICD-10-CM

## 2023-11-16 PROCEDURE — 99213 OFFICE O/P EST LOW 20 MIN: CPT | Performed by: PAIN MEDICINE

## 2023-11-16 PROCEDURE — 1090F PRES/ABSN URINE INCON ASSESS: CPT | Performed by: PAIN MEDICINE

## 2023-11-16 PROCEDURE — G8427 DOCREV CUR MEDS BY ELIG CLIN: HCPCS | Performed by: PAIN MEDICINE

## 2023-11-16 PROCEDURE — 3075F SYST BP GE 130 - 139MM HG: CPT | Performed by: PAIN MEDICINE

## 2023-11-16 PROCEDURE — G8417 CALC BMI ABV UP PARAM F/U: HCPCS | Performed by: PAIN MEDICINE

## 2023-11-16 PROCEDURE — 3078F DIAST BP <80 MM HG: CPT | Performed by: PAIN MEDICINE

## 2023-11-16 PROCEDURE — 1123F ACP DISCUSS/DSCN MKR DOCD: CPT | Performed by: PAIN MEDICINE

## 2023-11-16 PROCEDURE — 1036F TOBACCO NON-USER: CPT | Performed by: PAIN MEDICINE

## 2023-11-16 PROCEDURE — G8400 PT W/DXA NO RESULTS DOC: HCPCS | Performed by: PAIN MEDICINE

## 2023-11-16 PROCEDURE — G8484 FLU IMMUNIZE NO ADMIN: HCPCS | Performed by: PAIN MEDICINE

## 2023-11-16 RX ORDER — OXYCODONE HYDROCHLORIDE AND ACETAMINOPHEN 5; 325 MG/1; MG/1
1 TABLET ORAL DAILY PRN
Qty: 30 TABLET | Refills: 0 | Status: SHIPPED | OUTPATIENT
Start: 2023-11-21 | End: 2023-12-21

## 2023-12-07 NOTE — PROGRESS NOTES
[facility]  [address], [address] [facility]    Follow up Note      Danny Pan     Date of Visit:  2023    CC:  Patient presents for follow up   Chief Complaint   Patient presents with    post-herpatic neuralgia    Back Pain     HPI:    Pain is unchanged. Change in quality of symptoms:no. Medication side effects:none. Recent diagnostic testing:none. Recent interventional procedures:none. She has not been on anticoagulation medications and has not been on herbal supplements. She is not diabetic. Imaging:   Thoracic MRI 2018 -   FINDINGS: Multiplanar, multisequence MR images of the thoracic spine. Images degraded by artifact. Thoracic vertebral height and alignment intact. No acute osseous   signal and mallet. Thoracic spinal cord exhibits unremarkable caliber   and signal. Within the exam limits, no disc herniation or central   spinal canal impingement. Lumbar xray 2018 -   1. Osteoporosis and rotoscoliosis. 2. Multilevel degenerative disc disease. 3. Disc narrowing at L3-4 is seen. Thoracic xray 2018 -   1. Osteoporosis and rotoscoliosis. 2. Multilevel disc narrowing with only minimal spurring. 3. No fracture, subluxation or destructive lesion.       Previous treatments: Acupuncture, Nerve block (intercostal), Epidural Steroid Injection x5 and medications ((gabapentin, nortriptyline, lidocaine patches and cream, prednisone, capsaicin cream, tramadol, duloxetine, cyclobenzaprine, Lyrica 50 mg and 100 mg, aspercream/lidocaine, cymbalta, fentanyl patches 12 mcg/hr and 25 mcg/hr, oxycodone 5 mg and 10 mg, carbamazepine (caused staggering and double vision), terrasil cream)) - the medications sometimes work for a short time then stop working     Potential Aberrant Drug-Related Behavior: no      Urine Drug Screenin2018 consistent  2023 consistent     OARRS report:  10/2018 consistent  2018 consistent  10/2020 consistent  2023-2023

## 2023-12-08 ENCOUNTER — OFFICE VISIT (OUTPATIENT)
Dept: PAIN MANAGEMENT | Age: 83
End: 2023-12-08
Payer: MEDICARE

## 2023-12-08 VITALS
OXYGEN SATURATION: 95 % | TEMPERATURE: 97.2 F | WEIGHT: 162 LBS | SYSTOLIC BLOOD PRESSURE: 118 MMHG | RESPIRATION RATE: 18 BRPM | DIASTOLIC BLOOD PRESSURE: 79 MMHG | HEART RATE: 80 BPM | BODY MASS INDEX: 30.58 KG/M2 | HEIGHT: 61 IN

## 2023-12-08 DIAGNOSIS — G58.8 INTERCOSTAL NEURALGIA: ICD-10-CM

## 2023-12-08 DIAGNOSIS — B02.29 POSTHERPETIC NEURALGIA: ICD-10-CM

## 2023-12-08 DIAGNOSIS — M79.2 NEUROPATHIC PAIN: ICD-10-CM

## 2023-12-08 DIAGNOSIS — G89.4 CHRONIC PAIN SYNDROME: Primary | ICD-10-CM

## 2023-12-08 PROCEDURE — 3078F DIAST BP <80 MM HG: CPT | Performed by: PAIN MEDICINE

## 2023-12-08 PROCEDURE — 99213 OFFICE O/P EST LOW 20 MIN: CPT | Performed by: PAIN MEDICINE

## 2023-12-08 PROCEDURE — G8427 DOCREV CUR MEDS BY ELIG CLIN: HCPCS | Performed by: PAIN MEDICINE

## 2023-12-08 PROCEDURE — 1123F ACP DISCUSS/DSCN MKR DOCD: CPT | Performed by: PAIN MEDICINE

## 2023-12-08 PROCEDURE — 3074F SYST BP LT 130 MM HG: CPT | Performed by: PAIN MEDICINE

## 2023-12-08 PROCEDURE — G8400 PT W/DXA NO RESULTS DOC: HCPCS | Performed by: PAIN MEDICINE

## 2023-12-08 PROCEDURE — 1036F TOBACCO NON-USER: CPT | Performed by: PAIN MEDICINE

## 2023-12-08 PROCEDURE — G8417 CALC BMI ABV UP PARAM F/U: HCPCS | Performed by: PAIN MEDICINE

## 2023-12-08 PROCEDURE — 1090F PRES/ABSN URINE INCON ASSESS: CPT | Performed by: PAIN MEDICINE

## 2023-12-08 PROCEDURE — G8484 FLU IMMUNIZE NO ADMIN: HCPCS | Performed by: PAIN MEDICINE

## 2023-12-08 RX ORDER — OXYCODONE HYDROCHLORIDE AND ACETAMINOPHEN 5; 325 MG/1; MG/1
1 TABLET ORAL DAILY PRN
Qty: 30 TABLET | Refills: 0 | Status: SHIPPED | OUTPATIENT
Start: 2023-12-21 | End: 2024-01-20

## 2023-12-08 NOTE — PROGRESS NOTES
Carmen Joe presents to the [facility] on 12/8/2023. Silvia Keys is complaining of pain under her right breast and in her back. The pain is constant. The pain is described as aching, dull, and sharp. Pain is rated on her best day at a 1, on her worst day at a 10, and on average at a 4 on the VAS scale. She took her last dose of Percocet and tylenol yesterday. Any procedures since your last visit: No    Pacemaker or defibrillator: No     She is not on NSAIDS and is not on anticoagulation medications to include none and is managed by NA. Medication Contract and Consent for Opioid Use Documents Filed       Patient Documents       Type of Document Status Date Received Received By Description    Medication Contract Received 8/15/2018  1:26 PM Talon BARAHONA [facility] PAIN MANAGEMENT PATIENT AGREEMENT 8/15/2018    Medication Contract Received 1/26/2019  9:43 AM Amrita Horse [facility] medication contract    Medication Contract Received 10/21/2020  2:48 PM Tracy ARGUELLO [facility]    Medication Contract Received 7/27/2023 10:10 AM MANUEL RODRIGUEZ medication contract                    There were no vitals taken for this visit. No LMP recorded. Patient has had a hysterectomy.

## 2024-07-18 NOTE — PROGRESS NOTES
[facility]  [address] [place] [id number]    Follow up Note      Sultana Tello     Date of Visit:  2024    CC:  Patient presents for follow up   Chief Complaint   Patient presents with    Follow-up     Right shoulder blade, under right breast and right back pain     HPI:    Pain is unchanged.  Change in quality of symptoms:no.    Medication side effects:none.   Recent diagnostic testing:none.   Recent interventional procedures:none.    She has not been on anticoagulation medications and has not been on herbal supplements.  She is not diabetic.     Imaging:   Thoracic MRI 2018 -   FINDINGS: Multiplanar, multisequence MR images of the thoracic spine.   Images degraded by artifact.       Thoracic vertebral height and alignment intact. No acute osseous   signal and mallet. Thoracic spinal cord exhibits unremarkable caliber   and signal. Within the exam limits, no disc herniation or central   spinal canal impingement.      Lumbar xray 2018 -   1. Osteoporosis and rotoscoliosis.   2. Multilevel degenerative disc disease.   3. Disc narrowing at L3-4 is seen.      Thoracic xray 2018 -   1. Osteoporosis and rotoscoliosis.   2. Multilevel disc narrowing with only minimal spurring.   3. No fracture, subluxation or destructive lesion.      Previous treatments: Acupuncture, Nerve block (intercostal), Epidural Steroid Injection x5 and medications ((gabapentin, nortriptyline, lidocaine patches and cream, prednisone, capsaicin cream, tramadol, duloxetine, cyclobenzaprine, Lyrica 50 mg and 100 mg, aspercream/lidocaine, cymbalta, fentanyl patches 12 mcg/hr and 25 mcg/hr, oxycodone 5 mg and 10 mg, carbamazepine (caused staggering and double vision), terrasil cream)) - the medications sometimes work for a short time then stop working     Potential Aberrant Drug-Related Behavior: no      Urine Drug Screenin2018 consistent  2023 consistent     OARRS report:  10/2018 consistent  2018

## 2024-07-19 ENCOUNTER — OFFICE VISIT (OUTPATIENT)
Dept: PAIN MANAGEMENT | Age: 84
End: 2024-07-19
Payer: MEDICARE

## 2024-07-19 VITALS
SYSTOLIC BLOOD PRESSURE: 126 MMHG | TEMPERATURE: 97.4 F | RESPIRATION RATE: 16 BRPM | OXYGEN SATURATION: 97 % | HEART RATE: 70 BPM | HEIGHT: 61 IN | BODY MASS INDEX: 30.59 KG/M2 | DIASTOLIC BLOOD PRESSURE: 80 MMHG | WEIGHT: 162.04 LBS

## 2024-07-19 DIAGNOSIS — M79.2 NEUROPATHIC PAIN: ICD-10-CM

## 2024-07-19 DIAGNOSIS — Z79.891 ADMISSION FOR LONG-TERM OPIATE ANALGESIC USE: ICD-10-CM

## 2024-07-19 DIAGNOSIS — G89.4 CHRONIC PAIN SYNDROME: Primary | ICD-10-CM

## 2024-07-19 DIAGNOSIS — B02.29 POSTHERPETIC NEURALGIA: ICD-10-CM

## 2024-07-19 DIAGNOSIS — G58.8 INTERCOSTAL NEURALGIA: ICD-10-CM

## 2024-07-19 LAB
SEND OUT REPORT: NORMAL
TEST NAME: NORMAL

## 2024-07-19 PROCEDURE — G8427 DOCREV CUR MEDS BY ELIG CLIN: HCPCS | Performed by: PAIN MEDICINE

## 2024-07-19 PROCEDURE — 3074F SYST BP LT 130 MM HG: CPT | Performed by: PAIN MEDICINE

## 2024-07-19 PROCEDURE — 1123F ACP DISCUSS/DSCN MKR DOCD: CPT | Performed by: PAIN MEDICINE

## 2024-07-19 PROCEDURE — 99213 OFFICE O/P EST LOW 20 MIN: CPT | Performed by: PAIN MEDICINE

## 2024-07-19 PROCEDURE — 1090F PRES/ABSN URINE INCON ASSESS: CPT | Performed by: PAIN MEDICINE

## 2024-07-19 PROCEDURE — 99213 OFFICE O/P EST LOW 20 MIN: CPT

## 2024-07-19 PROCEDURE — G8400 PT W/DXA NO RESULTS DOC: HCPCS | Performed by: PAIN MEDICINE

## 2024-07-19 PROCEDURE — 1036F TOBACCO NON-USER: CPT | Performed by: PAIN MEDICINE

## 2024-07-19 PROCEDURE — G8417 CALC BMI ABV UP PARAM F/U: HCPCS | Performed by: PAIN MEDICINE

## 2024-07-19 PROCEDURE — 3079F DIAST BP 80-89 MM HG: CPT | Performed by: PAIN MEDICINE

## 2024-07-19 RX ORDER — OXYCODONE HYDROCHLORIDE AND ACETAMINOPHEN 5; 325 MG/1; MG/1
1 TABLET ORAL DAILY PRN
Qty: 30 TABLET | Refills: 0 | Status: SHIPPED | OUTPATIENT
Start: 2024-07-19 | End: 2024-08-18

## 2024-07-19 RX ORDER — GABAPENTIN 300 MG/1
CAPSULE ORAL
COMMUNITY
Start: 2024-06-11

## 2024-07-19 NOTE — PROGRESS NOTES
Sultana Tello presents to the [facility] on 7/19/2024. Sultana is complaining of pain right shoulder blade, under right breast and right back. The pain is intermittent. The pain is described as stabbing, sharp, and burning. Pain is rated on her best day at a 1, on her worst day at a 10, and on average at a 5 on the VAS scale. She took her last dose of Neurontin yesterday, Tylenol PRN.     Any procedures since your last visit: No.    Pacemaker or defibrillator: No.    She is not on NSAIDS and is not on anticoagulation medications to include none.     Medication Contract and Consent for Opioid Use Documents Filed       Patient Documents       Type of Document Status Date Received Received By Description    Medication Contract Received 8/15/2018  1:26 PM CHELSEA BARAHONA PAIN MANAGEMENT PATIENT AGREEMENT 8/15/2018    Medication Contract Received 1/26/2019  9:43 AM HOLLIE KONG [facility] medication contract    Medication Contract Received 10/21/2020  2:48 PM SAL ARGUELLO [facility] CONTRACT    Medication Contract Received 7/27/2023 10:10 AM MANUEL RODRIGUEZ medication contract                    /80   Pulse 70   Temp 97.4 °F (36.3 °C) (Infrared)   Resp 16   Ht 1.549 m (5' 1\")   Wt 73.5 kg (162 lb 0.6 oz)   SpO2 97%   BMI 30.62 kg/m²      No LMP recorded. Patient has had a hysterectomy.

## 2024-07-29 LAB
SEND OUT REPORT: NORMAL
TEST NAME: NORMAL

## 2024-08-15 ENCOUNTER — OFFICE VISIT (OUTPATIENT)
Dept: PAIN MANAGEMENT | Age: 84
End: 2024-08-15
Payer: MEDICARE

## 2024-08-15 VITALS
BODY MASS INDEX: 30.58 KG/M2 | WEIGHT: 162 LBS | HEIGHT: 61 IN | OXYGEN SATURATION: 96 % | RESPIRATION RATE: 18 BRPM | SYSTOLIC BLOOD PRESSURE: 90 MMHG | TEMPERATURE: 96.4 F | HEART RATE: 86 BPM | DIASTOLIC BLOOD PRESSURE: 63 MMHG

## 2024-08-15 DIAGNOSIS — G58.8 INTERCOSTAL NEURALGIA: ICD-10-CM

## 2024-08-15 DIAGNOSIS — M79.2 NEUROPATHIC PAIN: ICD-10-CM

## 2024-08-15 DIAGNOSIS — B02.29 POSTHERPETIC NEURALGIA: ICD-10-CM

## 2024-08-15 DIAGNOSIS — G89.4 CHRONIC PAIN SYNDROME: Primary | ICD-10-CM

## 2024-08-15 PROCEDURE — 99213 OFFICE O/P EST LOW 20 MIN: CPT

## 2024-08-15 RX ORDER — OXYCODONE HYDROCHLORIDE AND ACETAMINOPHEN 5; 325 MG/1; MG/1
1 TABLET ORAL DAILY PRN
Qty: 30 TABLET | Refills: 0 | Status: SHIPPED | OUTPATIENT
Start: 2024-08-18 | End: 2024-09-17

## 2024-08-15 NOTE — PROGRESS NOTES
Sultana Tello presents to the [facility] on 8/15/2024. Sultana is complaining of pain right side, under right breast and arm. The pain is constant. The pain is described as aching, throbbing, stabbing, and sharp. Pain is rated on her best day at a 2, on her worst day at a 10, and on average at a 5 on the VAS scale. She took her last dose of Percocet, Neurontin, and Tylenol today.     Any procedures since your last visit: No    Pacemaker or defibrillator: No   She is not on NSAIDS and is not on anticoagulation medications to include none   Medication Contract and Consent for Opioid Use Documents Filed       Patient Documents       Type of Document Status Date Received Received By Description    Medication Contract Received 8/15/2018  1:26 PM CHELSEA BARAHONA PAIN MANAGEMENT PATIENT AGREEMENT 8/15/2018    Medication Contract Received 1/26/2019  9:43 AM HOLLIE KONG Neuro[facility] medication contract    Medication Contract Received 10/21/2020  2:48 PM SAL ARGUELLO [facility] CONTRACT    Medication Contract Received 7/27/2023 10:10 AM MANUEL RODRIGUEZ medication contract                    Resp 18   Ht 1.549 m (5' 1\")   Wt 73.5 kg (162 lb)   BMI 30.61 kg/m²      No LMP recorded. Patient has had a hysterectomy.    
    Extremities:    Tremors:None bilaterally upper and lower  Range of motion:pain with internal rotation of hips not done.  Intact:Yes  Edema:Normal    Neurological:    Gait:antalgic    Dermatology:    Skin:no unusual rashes, no skin lesions    Impression:    Right T8,9 dermatomal pain due to post herpetic neuralgia treated with [employer] SCS implant (trial done by Dr. Cardona, implant by Dr. Wetzel)  Had right T10 and T11 intercostal NB with Dr. Galdamez in [place]  In the past we moved forward with SCS due to patient failing multiple classes of medication, TESI, and intercostal NB.    She has continued to follow with Dr. Galdamez in [place]  He has done right T10 and T11 intercostal nerve blocks, some work better than others  Pt states Dr. Galdamez would not prescribe her pain medication because he does not do so for elderly people  His most recent injection was performed in May and has been working on and off  We previously repeated the intercostal nerve block at the same levels without relief in the past but we can certainly try again  They were in [place] from October to May         Med agreement updated today  UDS reviewed  OARRS reviewed  Intermittently she gets a rash and thus is afraid to utilize topical medications too frequently  Continue lidocaine patch  Continue lidocaine ointment, not to use at same time as patch  RF Percocet 5/325 daily prn #30   Right T9,10,11 intercostal NB without relief  Patient encouraged to stay active   Treatment plan discussed with the patient including potential medication side effects    We discussed with the patient that combining opioids, benzodiazepines, alcohol, illicit drugs or sleep aids increases the risk of respiratory depression including death. We discussed that these medications may cause drowsiness, sedation or dizziness and have counseled the patient not to drive or operate machinery. We have discussed that these medications will be prescribed only by one provider. We

## 2024-09-17 ENCOUNTER — OFFICE VISIT (OUTPATIENT)
Dept: PAIN MANAGEMENT | Age: 84
End: 2024-09-17
Payer: MEDICARE

## 2024-09-17 VITALS
DIASTOLIC BLOOD PRESSURE: 71 MMHG | OXYGEN SATURATION: 93 % | WEIGHT: 162 LBS | HEIGHT: 61 IN | BODY MASS INDEX: 30.58 KG/M2 | TEMPERATURE: 97.1 F | RESPIRATION RATE: 16 BRPM | HEART RATE: 82 BPM | SYSTOLIC BLOOD PRESSURE: 115 MMHG

## 2024-09-17 DIAGNOSIS — G89.4 CHRONIC PAIN SYNDROME: Primary | ICD-10-CM

## 2024-09-17 DIAGNOSIS — G58.8 INTERCOSTAL NEURALGIA: ICD-10-CM

## 2024-09-17 DIAGNOSIS — B02.29 POSTHERPETIC NEURALGIA: ICD-10-CM

## 2024-09-17 DIAGNOSIS — M79.2 NEUROPATHIC PAIN: ICD-10-CM

## 2024-09-17 DIAGNOSIS — Z79.891 ADMISSION FOR LONG-TERM OPIATE ANALGESIC USE: ICD-10-CM

## 2024-09-17 PROCEDURE — 99213 OFFICE O/P EST LOW 20 MIN: CPT | Performed by: PHYSICIAN ASSISTANT

## 2024-09-17 PROCEDURE — G8427 DOCREV CUR MEDS BY ELIG CLIN: HCPCS | Performed by: PHYSICIAN ASSISTANT

## 2024-09-17 PROCEDURE — G8417 CALC BMI ABV UP PARAM F/U: HCPCS | Performed by: PHYSICIAN ASSISTANT

## 2024-09-17 PROCEDURE — 1123F ACP DISCUSS/DSCN MKR DOCD: CPT | Performed by: PHYSICIAN ASSISTANT

## 2024-09-17 PROCEDURE — 1090F PRES/ABSN URINE INCON ASSESS: CPT | Performed by: PHYSICIAN ASSISTANT

## 2024-09-17 PROCEDURE — G8400 PT W/DXA NO RESULTS DOC: HCPCS | Performed by: PHYSICIAN ASSISTANT

## 2024-09-17 PROCEDURE — 99213 OFFICE O/P EST LOW 20 MIN: CPT

## 2024-09-17 PROCEDURE — 1036F TOBACCO NON-USER: CPT | Performed by: PHYSICIAN ASSISTANT

## 2024-09-17 PROCEDURE — 3074F SYST BP LT 130 MM HG: CPT | Performed by: PHYSICIAN ASSISTANT

## 2024-09-17 PROCEDURE — 3078F DIAST BP <80 MM HG: CPT | Performed by: PHYSICIAN ASSISTANT

## 2024-09-17 RX ORDER — OXYCODONE AND ACETAMINOPHEN 5; 325 MG/1; MG/1
1 TABLET ORAL DAILY PRN
Qty: 30 TABLET | Refills: 0 | Status: SHIPPED | OUTPATIENT
Start: 2024-09-17 | End: 2024-10-17

## 2024-10-24 ENCOUNTER — OFFICE VISIT (OUTPATIENT)
Dept: PAIN MANAGEMENT | Age: 84
End: 2024-10-24
Payer: MEDICARE

## 2024-10-24 VITALS
WEIGHT: 162 LBS | SYSTOLIC BLOOD PRESSURE: 105 MMHG | RESPIRATION RATE: 18 BRPM | HEART RATE: 66 BPM | OXYGEN SATURATION: 97 % | BODY MASS INDEX: 30.58 KG/M2 | HEIGHT: 61 IN | TEMPERATURE: 95.9 F | DIASTOLIC BLOOD PRESSURE: 62 MMHG

## 2024-10-24 DIAGNOSIS — B02.29 POSTHERPETIC NEURALGIA: ICD-10-CM

## 2024-10-24 DIAGNOSIS — G89.4 CHRONIC PAIN SYNDROME: Primary | ICD-10-CM

## 2024-10-24 DIAGNOSIS — M79.2 NEUROPATHIC PAIN: ICD-10-CM

## 2024-10-24 DIAGNOSIS — G58.8 INTERCOSTAL NEURALGIA: ICD-10-CM

## 2024-10-24 DIAGNOSIS — Z79.891 ADMISSION FOR LONG-TERM OPIATE ANALGESIC USE: ICD-10-CM

## 2024-10-24 PROCEDURE — G8484 FLU IMMUNIZE NO ADMIN: HCPCS | Performed by: PHYSICIAN ASSISTANT

## 2024-10-24 PROCEDURE — 1159F MED LIST DOCD IN RCRD: CPT | Performed by: PHYSICIAN ASSISTANT

## 2024-10-24 PROCEDURE — 1090F PRES/ABSN URINE INCON ASSESS: CPT | Performed by: PHYSICIAN ASSISTANT

## 2024-10-24 PROCEDURE — 3078F DIAST BP <80 MM HG: CPT | Performed by: PHYSICIAN ASSISTANT

## 2024-10-24 PROCEDURE — 1123F ACP DISCUSS/DSCN MKR DOCD: CPT | Performed by: PHYSICIAN ASSISTANT

## 2024-10-24 PROCEDURE — 1036F TOBACCO NON-USER: CPT | Performed by: PHYSICIAN ASSISTANT

## 2024-10-24 PROCEDURE — 99213 OFFICE O/P EST LOW 20 MIN: CPT | Performed by: PHYSICIAN ASSISTANT

## 2024-10-24 PROCEDURE — G8427 DOCREV CUR MEDS BY ELIG CLIN: HCPCS | Performed by: PHYSICIAN ASSISTANT

## 2024-10-24 PROCEDURE — 3074F SYST BP LT 130 MM HG: CPT | Performed by: PHYSICIAN ASSISTANT

## 2024-10-24 PROCEDURE — G8417 CALC BMI ABV UP PARAM F/U: HCPCS | Performed by: PHYSICIAN ASSISTANT

## 2024-10-24 PROCEDURE — G8400 PT W/DXA NO RESULTS DOC: HCPCS | Performed by: PHYSICIAN ASSISTANT

## 2024-10-24 RX ORDER — OXYCODONE AND ACETAMINOPHEN 5; 325 MG/1; MG/1
1 TABLET ORAL DAILY PRN
Qty: 30 TABLET | Refills: 0 | Status: SHIPPED | OUTPATIENT
Start: 2024-10-24 | End: 2024-11-23

## 2024-10-24 NOTE — PROGRESS NOTES
Sultana Tello presents to the [facility] on 10/24/2024. Sultana is complaining of pain right rib pain. The pain is constant. The pain is described as aching, sharp and dull. Pain is rated on her best day at a 1, on her worst day at a 10, and on average at a 4 on the VAS scale. She took her last dose of Tylenol, Percocet and Gabapentin today.     Any procedures since your last visit: No  Pacemaker or defibrillator: No  She is not on NSAIDS and is not on anticoagulation medications  Medication Contract and Consent for Opioid Use Documents Filed       Patient Documents       Type of Document Status Date Received Received By Description    Medication Contract Received 8/15/2018  1:26 PM CHELSEA BARAHONA PAIN MANAGEMENT PATIENT AGREEMENT 8/15/2018    Medication Contract Received 1/26/2019  9:43 AM HOLLIE KONG Neurosurgery medication contract    Medication Contract Received 10/21/2020  2:48 PM SAL ARGUELLO [facility] CONTRACT    Medication Contract Received 7/27/2023 10:10 AM MANUEL RODRIGUEZ medication contract    Medication Contract Received 8/16/2024  2:07 PM KHUSHI MCCALL [employer] Agreement                    Resp 18   Ht 1.549 m (5' 1\")   Wt 73.5 kg (162 lb)   BMI 30.61 kg/m²      No LMP recorded. Patient has had a hysterectomy.    
done.  Intact:Yes  Edema:Normal    Neurological:    Gait:antalgic    Dermatology:    Skin:no unusual rashes, no skin lesions, no palpable subcutaneous nodules and good skin turgor    Impression:    Right T8,9 dermatomal pain due to post herpetic neuralgia treated with [employer] SCS implant (trial done by Dr. Cardona, implant by Dr. Wetzel)  Had right T10 and T11 intercostal NB with Dr. Galdamez in [place]  In the past we moved forward with SCS due to patient failing multiple classes of medication, TESI, and intercostal NB.     She has continued to follow with Dr. Galdamez in [place]  He has done right T10 and T11 intercostal nerve blocks, some work better than others  Pt states Dr. Galdamez would not prescribe her pain medication because he does not do so for elderly people  His most recent injection was performed in May and has been working on and off  We previously repeated the intercostal nerve block at the same levels without relief in the past but we can certainly try again  They were in [place] from October to May    She will be back in May.             OARRS reviewed  Intermittently she gets a rash and thus is afraid to utilize topical medications too frequently  Continue lidocaine patch  Continue lidocaine ointment, not to use at same time as patch  RF Percocet 5/325 daily prn #30   Right T9,10,11 intercostal NB without relief  Patient encouraged to stay active   Treatment plan discussed with the patient including potential medication side effects    Controlled Substance Monitoring:    Acute and Chronic Pain Monitoring:   RX Monitoring Periodic Controlled Substance Monitoring   10/24/2024  10:51 AM Possible medication side effects, risk of tolerance/dependence & alternative treatments discussed.;No signs of potential drug abuse or diversion identified.;Assessed functional status (ability to engage in work or other purposeful activities, the pain intensity and its interference with activities of daily living, quality

## 2024-12-05 ENCOUNTER — OFFICE VISIT (OUTPATIENT)
Dept: PAIN MANAGEMENT | Age: 84
End: 2024-12-05
Payer: MEDICARE

## 2024-12-05 VITALS
OXYGEN SATURATION: 96 % | HEART RATE: 78 BPM | RESPIRATION RATE: 16 BRPM | TEMPERATURE: 97.3 F | WEIGHT: 164 LBS | BODY MASS INDEX: 30.96 KG/M2 | DIASTOLIC BLOOD PRESSURE: 73 MMHG | SYSTOLIC BLOOD PRESSURE: 111 MMHG | HEIGHT: 61 IN

## 2024-12-05 DIAGNOSIS — G89.4 CHRONIC PAIN SYNDROME: Primary | ICD-10-CM

## 2024-12-05 DIAGNOSIS — Z79.891 ADMISSION FOR LONG-TERM OPIATE ANALGESIC USE: ICD-10-CM

## 2024-12-05 DIAGNOSIS — B02.29 POSTHERPETIC NEURALGIA: ICD-10-CM

## 2024-12-05 DIAGNOSIS — G58.8 INTERCOSTAL NEURALGIA: ICD-10-CM

## 2024-12-05 DIAGNOSIS — M79.2 NEUROPATHIC PAIN: ICD-10-CM

## 2024-12-05 PROCEDURE — G8484 FLU IMMUNIZE NO ADMIN: HCPCS | Performed by: PHYSICIAN ASSISTANT

## 2024-12-05 PROCEDURE — G8400 PT W/DXA NO RESULTS DOC: HCPCS | Performed by: PHYSICIAN ASSISTANT

## 2024-12-05 PROCEDURE — 1159F MED LIST DOCD IN RCRD: CPT | Performed by: PHYSICIAN ASSISTANT

## 2024-12-05 PROCEDURE — 1090F PRES/ABSN URINE INCON ASSESS: CPT | Performed by: PHYSICIAN ASSISTANT

## 2024-12-05 PROCEDURE — 99213 OFFICE O/P EST LOW 20 MIN: CPT | Performed by: PHYSICIAN ASSISTANT

## 2024-12-05 PROCEDURE — G8427 DOCREV CUR MEDS BY ELIG CLIN: HCPCS | Performed by: PHYSICIAN ASSISTANT

## 2024-12-05 PROCEDURE — 1123F ACP DISCUSS/DSCN MKR DOCD: CPT | Performed by: PHYSICIAN ASSISTANT

## 2024-12-05 PROCEDURE — 3074F SYST BP LT 130 MM HG: CPT | Performed by: PHYSICIAN ASSISTANT

## 2024-12-05 PROCEDURE — 1036F TOBACCO NON-USER: CPT | Performed by: PHYSICIAN ASSISTANT

## 2024-12-05 PROCEDURE — 3078F DIAST BP <80 MM HG: CPT | Performed by: PHYSICIAN ASSISTANT

## 2024-12-05 PROCEDURE — G8417 CALC BMI ABV UP PARAM F/U: HCPCS | Performed by: PHYSICIAN ASSISTANT

## 2024-12-05 RX ORDER — OXYCODONE AND ACETAMINOPHEN 5; 325 MG/1; MG/1
1 TABLET ORAL DAILY PRN
Qty: 30 TABLET | Refills: 0 | Status: SHIPPED | OUTPATIENT
Start: 2024-12-05 | End: 2025-01-04

## 2024-12-05 NOTE — PROGRESS NOTES
[facility]  [address] [place] [id number]    Follow up Note      Sultana Tello     Date of Visit:  2024    CC:  Patient presents for follow up   Chief Complaint   Patient presents with    Follow-up     Right breast pain       HPI:    Pain is unchanged.  No new issues.   Appropriate analgesia with current medications regimen: Not applicable.   Change in quality of symptoms:no.    Medication side effects:none.   Recent diagnostic testing:none.   Recent interventional procedures:none.    She has not been on anticoagulation medications and has not been on herbal supplements.  She is not diabetic.     Imaging:   Thoracic MRI 2018 -   FINDINGS: Multiplanar, multisequence MR images of the thoracic spine.   Images degraded by artifact.       Thoracic vertebral height and alignment intact. No acute osseous   signal and mallet. Thoracic spinal cord exhibits unremarkable caliber   and signal. Within the exam limits, no disc herniation or central   spinal canal impingement.      Lumbar xray 2018 -   1. Osteoporosis and rotoscoliosis.   2. Multilevel degenerative disc disease.   3. Disc narrowing at L3-4 is seen.      Thoracic xray 2018 -   1. Osteoporosis and rotoscoliosis.   2. Multilevel disc narrowing with only minimal spurring.   3. No fracture, subluxation or destructive lesion.      Previous treatments: Acupuncture, Nerve block (intercostal), Epidural Steroid Injection x5 and medications ((gabapentin, nortriptyline, lidocaine patches and cream, prednisone, capsaicin cream, tramadol, duloxetine, cyclobenzaprine, Lyrica 50 mg and 100 mg, aspercream/lidocaine, cymbalta, fentanyl patches 12 mcg/hr and 25 mcg/hr, oxycodone 5 mg and 10 mg, carbamazepine (caused staggering and double vision), terrasil cream)) - the medications sometimes work for a short time then stop working     Potential Aberrant Drug-Related Behavior: no      Urine Drug Screenin2018 consistent  2023

## 2024-12-05 NOTE — PROGRESS NOTES
Sultana Tello presents to the [facility] on 12/5/2024. Sultana is complaining of pain in her right breast. The pain is constant. The pain is described as aching, dull, and sharp. Pain is rated on her best day at a 1, on her worst day at a 10, and on average at a 4 on the VAS scale. She took her last dose of Neurontin today.     Any procedures since your last visit: No    Pacemaker or defibrillator: No    She is not on NSAIDS and is not on anticoagulation medications.    Do you want someone present when the provider examines you? no    Medication Contract and Consent for Opioid Use Documents Filed       Patient Documents       Type of Document Status Date Received Received By Description    Medication Contract Received 8/15/2018  1:26 PM CHELSEA BARAHONA PAIN MANAGEMENT PATIENT AGREEMENT 8/15/2018    Medication Contract Received 1/26/2019  9:43 AM HOLLIE KONG Neurosurgery medication contract    Medication Contract Received 10/21/2020  2:48 PM SAL ARGUELLO [facility] CONTRACT    Medication Contract Received 7/27/2023 10:10 AM MANUEL RODRIGUEZ medication contract    Medication Contract Received 8/16/2024  2:07 PM KHUSHI MCCALL [employer] Agreement                    /73   Pulse 78   Temp 97.3 °F (36.3 °C) (Infrared)   Resp 16   Ht 1.549 m (5' 1\")   Wt 74.4 kg (164 lb)   SpO2 96%   BMI 30.99 kg/m²      No LMP recorded. Patient has had a hysterectomy.

## (undated) DEVICE — GRADUATE

## (undated) DEVICE — REMOTE CONTROL KIT: Brand: FREELINK™

## (undated) DEVICE — GLOVE ORANGE PI 7 1/2   MSG9075

## (undated) DEVICE — GAUZE,SPONGE,4"X4",8PLY,STRL,LF,10/TRAY: Brand: MEDLINE

## (undated) DEVICE — 3M™ TEGADERM™ TRANSPARENT FILM DRESSING FRAME STYLE, 1628, 6 IN X 8 IN (15 CM X 20 CM), 10/CT 8CT/CASE: Brand: 3M™ TEGADERM™

## (undated) DEVICE — KIT POS W/ FOAM ARM CRADL SHEARGUARD CHST PD CVR FOR SPNL

## (undated) DEVICE — SYRINGE MED 20ML STD CLR PLAS LUERLOCK TIP N CTRL DISP

## (undated) DEVICE — KIT PT TRL HANDHELD COMB THER WAVEFORM AUTOMATION FOR SPNL

## (undated) DEVICE — DRAPE 64X41IN RADIOLOGY C ARM EQUIP STER

## (undated) DEVICE — GAUZE,SPONGE,4"X4",16PLY,STRL,LF,10/TRAY: Brand: MEDLINE

## (undated) DEVICE — GOWN SURG XL SMS FAB NONREINFORCED RAGLAN SLV HK LOOP CLSR

## (undated) DEVICE — GAUZE,SPONGE,4"X4",16PLY,XRAY,STRL,LF: Brand: MEDLINE

## (undated) DEVICE — GLOVE ORANGE PI 8   MSG9080

## (undated) DEVICE — HANDLE CVR PATENTED RETENTION DISC STRL LIGHT SHLD

## (undated) DEVICE — 3M™ IOBAN™ 2 ANTIMICROBIAL INCISE DRAPE 6650EZ: Brand: IOBAN™ 2

## (undated) DEVICE — SKIN AFFIX SURG ADHESIVE 72/CS 0.55ML: Brand: MEDLINE

## (undated) DEVICE — CODMAN® SURGICAL PATTIES 1/2" X 1" (1.27CM X 2.54CM): Brand: CODMAN®

## (undated) DEVICE — EXTRAS UGOKWE

## (undated) DEVICE — PAD ABD CURITY TENDERSORB 5X9IN

## (undated) DEVICE — BLADE CLP TAPR HD WET DRY CAPABILITY GTT IN CHARGING USE

## (undated) DEVICE — NON-DEHP CATHETER EXTENSION SET, MALE LUER LOCK ADAPTER

## (undated) DEVICE — KIT CHARGING CHRG BASE STN PWR SUPL CHRG BELT PRECIS SPECTR

## (undated) DEVICE — 3M(TM) MEDIPORE(TM) +PAD SOFT CLOTH ADHESIVE WOUND DRESSING 3569: Brand: 3M™ MEDIPORE™

## (undated) DEVICE — 1810 FOAM BLOCK NEEDLE COUNTER: Brand: DEVON

## (undated) DEVICE — 35CM LONG TUNNELING TOOL

## (undated) DEVICE — DRAPE SHEET, X-LARGE: Brand: CONVERTORS

## (undated) DEVICE — INTENDED FOR TISSUE SEPARATION, AND OTHER PROCEDURES THAT REQUIRE A SHARP SURGICAL BLADE TO PUNCTURE OR CUT.: Brand: BARD-PARKER ® STAINLESS STEEL BLADES

## (undated) DEVICE — Device

## (undated) DEVICE — SYRINGE, LUER LOCK, 5ML: Brand: MEDLINE

## (undated) DEVICE — STAPLER SKIN L39MM DIA0.53MM CRWN 5.7MM S STL FIX HD PROX

## (undated) DEVICE — SOLUTION IV IRRIG WATER 1000ML POUR BRL 2F7114

## (undated) DEVICE — TUBING, SUCTION, 3/16" X 12', STRAIGHT: Brand: MEDLINE

## (undated) DEVICE — MARKER,SKIN,WI/RULER AND LABELS: Brand: MEDLINE

## (undated) DEVICE — DRAPE CAM W7XL96IN W/ BLU KRATON TIP FOR LSR VID

## (undated) DEVICE — NEEDLE HYPO 18GA L1.5IN PNK POLYPR HUB S STL REG BVL STR

## (undated) DEVICE — Device: Brand: PORTEX

## (undated) DEVICE — TUBING SUCT 12FR MAL ALUM SHFT FN CAP VENT UNIV CONN W/ OBT

## (undated) DEVICE — E-Z CLEAN, NON-STICK, PTFE COATED, ELECTROSURGICAL BLADE ELECTRODE, 6.5 INCH (16.5 CM): Brand: MEGADYNE

## (undated) DEVICE — O.R. CABLE 1X16, 61CM AND EXTENSION

## (undated) DEVICE — LEAD BLANK: Brand: PRECISION ™

## (undated) DEVICE — Z CONVERTED USE 2275207 CLOTH PREP W7.5XL7.5IN 2% CHG SKIN ALC AND RNS FREE

## (undated) DEVICE — BANDAGE ADH W1XL3IN NAT FAB WVN FLX DURABLE N ADH PD SEAL

## (undated) DEVICE — TOWEL OR BLUEE 16X26IN ST 8 PACK ORB08 16X26ORTWL

## (undated) DEVICE — CHLORAPREP 26ML ORANGE

## (undated) DEVICE — 6 ML SYRINGE LUER-LOCK TIP: Brand: MONOJECT

## (undated) DEVICE — NEEDLE HYPO 25GA L1.5IN BLU POLYPR HUB S STL REG BVL STR

## (undated) DEVICE — 12 ML SYRINGE,LUER-LOCK TIP: Brand: MONOJECT

## (undated) DEVICE — READY WET SKIN SCRUB TRAY-LF: Brand: MEDLINE INDUSTRIES, INC.

## (undated) DEVICE — 5.0MM PRECISION ROUND

## (undated) DEVICE — LAPAROTOMY PACK WITH POLYREINFORCED GOWNS: Brand: CONVERTORS

## (undated) DEVICE — BLADE CLIPPER GEN PURP NS

## (undated) DEVICE — SUTURE ETHLN SZ 2-0 L18IN NONABSORBABLE BLK L26MM FS 3/8 664G

## (undated) DEVICE — 1000 S-DRAPE TOWEL DRAPE 10/BX: Brand: STERI-DRAPE™

## (undated) DEVICE — HYPODERMIC SAFETY NEEDLE: Brand: MAGELLAN

## (undated) DEVICE — DOUBLE BASIN SET: Brand: MEDLINE INDUSTRIES, INC.

## (undated) DEVICE — SURGICAL PROCEDURE PACK LAMINECTOMY LUMBAR

## (undated) DEVICE — CONTROL SYRINGE LUER-LOCK TIP: Brand: MONOJECT

## (undated) DEVICE — GLOVE SURG SZ 85 STD WHT LTX SYN POLYMER BEAD REINF ANTI RL

## (undated) DEVICE — SET SPINAL NEURO STNEU1

## (undated) DEVICE — HALF SHEET: Brand: CONVERTORS

## (undated) DEVICE — LABEL MED 4 IN SURG PANEL W/ PEN STRL

## (undated) DEVICE — 3M™ RED DOT™ MONITORING ELECTRODE WITH FOAM TAPE AND STICKY GEL 2560, 50/BAG, 20/CASE, 72/PLT: Brand: RED DOT™

## (undated) DEVICE — GLOVE ORANGE PI 7   MSG9070

## (undated) DEVICE — GOWN,SIRUS,FABRNF,L,20/CS: Brand: MEDLINE

## (undated) DEVICE — 1.5L THIN WALL CAN: Brand: CRD

## (undated) DEVICE — ELEVATOR NEUROSTIMULATOR SPNL PASS FOR SPNL CRD STIM SYS

## (undated) DEVICE — 1530S-1, 1 IN X 1.5 YD (2,5 CM X 1,37 M), UNPACKAGED ROLLS, 100 RL/CARTON, 5 CARTONS/CASE, 500 RL/CA: Brand: 3M™ MICROPORE™

## (undated) DEVICE — NEEDLE HYPO 18GA L1.5IN PNK POLYPR HUB S STL THN WALL FILL

## (undated) DEVICE — DRESSING COMP W4XL4IN N ADH PD W2.5XL2.5IN GZ BORDERED ADH

## (undated) DEVICE — PACK,UNIV, II AURORA: Brand: MEDLINE

## (undated) DEVICE — PEN: MARKING STD 100/CS: Brand: MEDICAL ACTION INDUSTRIES

## (undated) DEVICE — APPLICATOR PREP 26ML 0.7% IOD POVACRYLEX 74% ISO ALC ST